# Patient Record
Sex: MALE | Race: WHITE | NOT HISPANIC OR LATINO | Employment: PART TIME | URBAN - METROPOLITAN AREA
[De-identification: names, ages, dates, MRNs, and addresses within clinical notes are randomized per-mention and may not be internally consistent; named-entity substitution may affect disease eponyms.]

---

## 2024-03-01 ENCOUNTER — OFFICE VISIT (OUTPATIENT)
Dept: ORTHOPEDIC SURGERY | Facility: CLINIC | Age: 69
End: 2024-03-01
Payer: MEDICARE

## 2024-03-01 ENCOUNTER — HOSPITAL ENCOUNTER (OUTPATIENT)
Dept: RADIOLOGY | Facility: CLINIC | Age: 69
Discharge: HOME | End: 2024-03-01
Payer: MEDICARE

## 2024-03-01 DIAGNOSIS — M47.27 LUMBOSACRAL RADICULOPATHY DUE TO DEGENERATIVE JOINT DISEASE OF SPINE: ICD-10-CM

## 2024-03-01 DIAGNOSIS — M25.552 HIP PAIN, ACUTE, LEFT: ICD-10-CM

## 2024-03-01 PROCEDURE — 99214 OFFICE O/P EST MOD 30 MIN: CPT | Performed by: STUDENT IN AN ORGANIZED HEALTH CARE EDUCATION/TRAINING PROGRAM

## 2024-03-01 PROCEDURE — 73502 X-RAY EXAM HIP UNI 2-3 VIEWS: CPT | Mod: LEFT SIDE | Performed by: STUDENT IN AN ORGANIZED HEALTH CARE EDUCATION/TRAINING PROGRAM

## 2024-03-01 PROCEDURE — 1036F TOBACCO NON-USER: CPT | Performed by: STUDENT IN AN ORGANIZED HEALTH CARE EDUCATION/TRAINING PROGRAM

## 2024-03-01 PROCEDURE — 99204 OFFICE O/P NEW MOD 45 MIN: CPT | Performed by: STUDENT IN AN ORGANIZED HEALTH CARE EDUCATION/TRAINING PROGRAM

## 2024-03-01 PROCEDURE — 73502 X-RAY EXAM HIP UNI 2-3 VIEWS: CPT | Mod: LT

## 2024-03-01 PROCEDURE — 1159F MED LIST DOCD IN RCRD: CPT | Performed by: STUDENT IN AN ORGANIZED HEALTH CARE EDUCATION/TRAINING PROGRAM

## 2024-03-01 RX ORDER — CYCLOBENZAPRINE HCL 10 MG
10 TABLET ORAL NIGHTLY PRN
Qty: 30 TABLET | Refills: 0 | Status: SHIPPED | OUTPATIENT
Start: 2024-03-01 | End: 2024-04-24

## 2024-03-01 RX ORDER — METHYLPREDNISOLONE 4 MG/1
TABLET ORAL
Qty: 21 TABLET | Refills: 0 | Status: SHIPPED | OUTPATIENT
Start: 2024-03-01 | End: 2024-04-06 | Stop reason: WASHOUT

## 2024-03-01 NOTE — PROGRESS NOTES
Chief Complaint   Patient presents with    Left Hip - Pain, New Patient Visit     Xrays today       HPI  69-year-old male with longstanding left hip pain/buttock pain that has been present since October.  And then seeing a chiropractor states that this has actually Made it worse.  States the pain is primary about the posterior aspect of the buttock and does radiate down the leg.  States that the pain is worse when getting up from a seated chair take about the low back.  Some days better than others.  Denies any bowel or bladder symptoms.    History reviewed. No pertinent past medical history.    History reviewed. No pertinent surgical history.     No Known Allergies     Physical exam    General: Alert and oriented to place, person, and time.  No acute distress and breathing comfortably; pleasant and cooperative with the examination.  HEENT: Head is normocephalic and atraumatic.  Neck: Supple, no visible swelling.  Cardiovascular: Good perfusion to the affected extremity.  Lungs: No audible wheezing or labored breathing.  Abdomen: Nondistended  HEME/Lymph : No visible abnormalities bilateral lower extremity    Extremity:  Physical Examination:  Left Hip:  Normal gait  Negative Trendelenburg sign  Skin healthy and intact  No tenderness to palpation over lumbar spine  No tenderness over greater trochanter    Full forward flexion  Symmetric motion, no loss of internal rotation   No weakness with resisted hip flexion, abduction or adduction    Negative flexion/adduction/internal rotation  Negative flexion/abduction/external rotation test  Pain with straight leg raise    Motor exam: Gross strength intact knee flexion extension, hip flexion extension, ankle dorsiflexion plantarflexion without any obvious deficits  Sensation intact L2-S1  No upper motor neuron signs      Diagnostics:  XR hip left with pelvis when performed 2 or 3 views    Result Date: 3/1/2024  Interpreted By:  Felice Haines III, STUDY: XR HIP LEFT  WITH PELVIS WHEN PERFORMED 2 OR 3 VIEWS; ;  3/1/2024 9:59 am   INDICATION: Signs/Symptoms:pain.   COMPARISON: None.   ACCESSION NUMBER(S): [id number]   ORDERING CLINICIAN: MARCOS COBOS   FINDINGS: AP pelvis, lateral left hip: No acute osseous abnormality no fracture or dislocation appreciated mild joint space narrowing about the left hip articulation with mild subchondral cystic change. No blastic or lytic lesions appreciated.       No acute osseous abnormality     MACRO: None   Signed by: Marcos Cobos III 3/1/2024 11:54 AM Dictation workstation:   [id number]       Procedure:  Procedures    Assessment:  69-year-old male with degenerative disc disease lumbar spine, lumbar radiculopathy    Treatment plan:  The natural history of the condition and its associated treatment alternatives including surgical and nonsurgical options were discussed with the patient at length.  Will give patient a prescription for oral Medrol Dosepak, Flexeril as well as provide a prescription for physical therapy  Patient did provide x-rays today that were obtained at chiropractic office which did indicate severe degenerative scoliosis about the lumbar spine multilevel diffuse degenerative changes.  I do think this is likely emanating from the lumbar spine.  Patient feels improved with therapy as well as steroids we will proceed with the MRI.  Follow-up in 6 weeks to 2 months time.  Patient works in the pool industry.  All of the patient's questions were answered.

## 2024-03-08 ENCOUNTER — APPOINTMENT (OUTPATIENT)
Dept: PRIMARY CARE | Facility: CLINIC | Age: 69
End: 2024-03-08
Payer: MEDICARE

## 2024-03-12 NOTE — PROGRESS NOTES
"  Physical Therapy  Physical Therapy Evaluation    Patient Name: Ridge Thomas \"Tom\"  MRN: [id number]  Today's Date: 3/13/2024  Time Calculation  Start Time: 0908  Stop Time: 0953  Time Calculation (min): 45 min  Insurance:  COPAY 0 (0)  Visit number: 1   Authorization info: NO AUTH REQ   Insurance Type: MEDICARE/ MMOSUP    General:  Reason for visit: Back pain  Referred by: MD Zaki    Current Problem:  M47.27 Lumbosacral radiculopathy due to degenerative joint disease of spine     Precautions:        Medical History Form: Reviewed (scanned into chart)    Subjective:   69-year-old male with longstanding left hip pain/buttock pain that has been present since October.  And then seeing a chiropractor states that this has actually Made it worse.  States the pain is primary about the posterior aspect of the buttock and does radiate down the leg.  States that the pain is worse when getting up from a seated chair take about the low back.  Some days better than others.  Denies any bowel or bladder symptoms.-Encounter note 3/1/2024     Chief Complaint: Patient presents to clinic LBP  Onset Date: 10/1/2023  ZIGGY: Insidious    Current Condition:   Better    Pain:  Pain Assessment: 0-10  Pain Score: 7  Pain Type: Deep somatic pain  Pain Location: Back  Pain Orientation: Left  Pain Radiating Towards: Left LE  Pain Descriptors: Sharp  Pain Frequency: Intermittent  Clinical Progression: Not changed  Effect of Pain on Daily Activities: makes it difficult  Aggravating Factors:  Bending Forward  Relieving Factors:  Rest    Relevant Information (PMH & Previous Tests/Imaging): XR  Previous Interventions/Treatments: Chiropractic    Prior Level of Function (PLOF)  Patient previously independent with all ADLs  Work/School: Seasonal-to start 4/1/2024    Patients Living Environment: Reviewed and no concern    Primary Language: English    Patient's Goal(s) for Therapy: Pain relief    Red Flags: Do you have any of the following? " No  Fever/chills, unexplained weight changes, dizziness/fainting, unexplained change in bowel or bladder functions, unexplained malaise or muscle weakness, night pain/sweats, numbness or tingling    Objective:  Slump (-)  Leg Lowering (+)    LUMBAR AROM  FF 5  EX 10  RR 5  LR 5  RSB 5  LSB 5    HIP AROM  R WFL  L WFL    Posture: WNL    Lower Extremity Functional Movements  Transfers: IND  Gait: IND    Outcome Measures:  OSWESTRY 18%    EDUCATION: Pt educated in HEP, PT POC, anatomy, activity avoidance, proper positioning/posture, use of cold , pt demonstrates understanding of PT info, all questions answered.    HEP:    Goals: Set and discussed today  Increase ROM to WFL of LB  Increase Strength where deficits noted by 1/3 to 1 mm grade of abdominal mm  Ind w/ HEP to expedite progress and promote goal achievement.    Improve Outcome score to < or equal to 10% for evidence of improved function.  Return to PLOF   Decrease pain to 2/10 or less    Plan of care was developed with input and agreement by the patient.    Treatment Performed:    Therapeutic Exercise:    23 min  SKC 10x, DKC 10x, Ab Setting 10x10s, QS 20x, GS 20x, Crunches 2x5    Assessment: 68 y/o M presents with c/o LBP and LUE pain. Upon examination patient demonstrates moderate pain limiting overall functional mobility including FF of back. Activity limitations and participations restrictions include tying shoes. Pt to benefit from outpatient PT to address deficits, maximize functional mobility and improve QOL.  The clinical presentation of this patient is stable and their history and examination findings are consistent with a low complexity evaluation with good rehab potential.          Plan:     Planned Interventions include: therapeutic exercise, self-care home management, manual therapy, therapeutic activities, gait training, neuromuscular coordination, vasopneumatic, dry needling, aquatic therapy  Frequency: 2x/wk  Duration: 4wks    David Dietz  PT

## 2024-03-13 ENCOUNTER — EVALUATION (OUTPATIENT)
Dept: PHYSICAL THERAPY | Facility: CLINIC | Age: 69
End: 2024-03-13
Payer: MEDICARE

## 2024-03-13 DIAGNOSIS — M47.27 LUMBOSACRAL RADICULOPATHY DUE TO DEGENERATIVE JOINT DISEASE OF SPINE: ICD-10-CM

## 2024-03-13 PROCEDURE — 97110 THERAPEUTIC EXERCISES: CPT | Mod: GP

## 2024-03-13 ASSESSMENT — PAIN - FUNCTIONAL ASSESSMENT: PAIN_FUNCTIONAL_ASSESSMENT: 0-10

## 2024-03-13 ASSESSMENT — ENCOUNTER SYMPTOMS
OCCASIONAL FEELINGS OF UNSTEADINESS: 0
DEPRESSION: 0
LOSS OF SENSATION IN FEET: 0

## 2024-03-13 ASSESSMENT — PAIN DESCRIPTION - DESCRIPTORS: DESCRIPTORS: SHARP

## 2024-03-13 ASSESSMENT — PAIN SCALES - GENERAL: PAINLEVEL_OUTOF10: 7

## 2024-03-15 ENCOUNTER — APPOINTMENT (OUTPATIENT)
Dept: PRIMARY CARE | Facility: CLINIC | Age: 69
End: 2024-03-15
Payer: MEDICARE

## 2024-03-20 ENCOUNTER — TREATMENT (OUTPATIENT)
Dept: PHYSICAL THERAPY | Facility: CLINIC | Age: 69
End: 2024-03-20
Payer: MEDICARE

## 2024-03-20 DIAGNOSIS — M47.27 LUMBOSACRAL RADICULOPATHY DUE TO DEGENERATIVE JOINT DISEASE OF SPINE: Primary | ICD-10-CM

## 2024-03-20 PROCEDURE — 97110 THERAPEUTIC EXERCISES: CPT | Mod: GP,CQ

## 2024-03-20 PROCEDURE — 97140 MANUAL THERAPY 1/> REGIONS: CPT | Mod: GP,CQ

## 2024-03-20 ASSESSMENT — PAIN - FUNCTIONAL ASSESSMENT: PAIN_FUNCTIONAL_ASSESSMENT: 0-10

## 2024-03-20 ASSESSMENT — PAIN SCALES - GENERAL: PAINLEVEL_OUTOF10: 5 - MODERATE PAIN

## 2024-03-20 NOTE — PROGRESS NOTES
Physical Therapy Treatment    Patient Name: Ridge Thomas  MRN: [id number]  Today's Date: 3/20/2024  Time Calculation  Start Time: 0910  Stop Time: 0950  Time Calculation (min): 40 min  PT Therapeutic Procedures Time Entry  Manual Therapy Time Entry: 15  Therapeutic Exercise Time Entry: 25       Current Problem  1. Lumbosacral radiculopathy due to degenerative joint disease of spine            Subjective   General   Pt stating continued low back pain. Still with some radicular pain into L hip and thigh. Denies saddle anesthesia. Denies bladder/bowel incontinence.   Insurance   COPAY 0 (0)  Visit number: 2  Authorization info: NO AUTH REQ   Insurance Type: MEDICARE/ Cash Check Card  Pain  Pain Assessment: 0-10  Pain Score: 5 - Moderate pain  Pain Location: Back  Pain Orientation: Lower    Objective   Treatments:  L lateral shift corrections 4x10  Standing extensions x10  Prone 3 min  DARIO 3 min  Bridges 2x10  SLR supine/prone x15    Manual 15' PA mobs lumbar spine  Assessment   Resolution L LE symptoms L lateral flexion at wall with reduction LBP extension in prone. Unable to tolerate loaded extensions today with recreation radicular symptoms noted. Educated on typical response to treatment, emphasis on symptom management at this point. Educated on spinal alignment, avoiding prolonged flexion forces through lumbar spine including use of lumbar roll when sitting.   Plan:  Progress with POC as tolerated.    OP EDUCATION:   Access Code: [email or web address]  URL: [email or web address]  Date: 03/20/2024  Prepared by: Earle Mukherjee    Goals:

## 2024-03-25 NOTE — PROGRESS NOTES
Physical Therapy Treatment    Patient Name: Ridge Thomas  MRN: [id number]  Today's Date: 3/26/2024  Time Calculation  Start Time: 0955  Stop Time: 1038  Time Calculation (min): 43 min  Current Problem  No diagnosis found.    Subjective   General   Pt states he feels much better and is not having any pain currently.  Insurance   COPAY 0 (0)  Visit number: 4  Authorization info: NO AUTH REQ   Insurance Type: MEDICARE/ MMOSUP  Pain  Pain Assessment: 0-10  Pain Score: 0 - No pain  Pain Location: Back  Pain Orientation: Lower    Objective   Treatments:  MARIAM 6min  Standing extensions x20  Standing lateral bends 20x BL  Pulldowns GTB 20x  Rows GTB 20x  Trunk RR/LR GTB 20x ea  Prone 3 min  DARIO 3 min  Bridges 2x15  SLR supine/prone x15    Manual 10' PA mobs lumbar spine    Assessment:   Pt able to tolerate tx session well this date w/ no adverse effects noted from tx.  Pt compliant w/ program and appears to understand rationale for therapy.  Still requires skilled therapy for increasing strength/ROM for performing ADLs.      Plan:   Cont w current POC and progress pt as tolerated.     OP EDUCATION:   Access Code: [email or web address]  URL: [email or web address]  Date: 03/20/2024  Prepared by: Earle Mukherjee    Goals:

## 2024-03-26 ENCOUNTER — TREATMENT (OUTPATIENT)
Dept: PHYSICAL THERAPY | Facility: CLINIC | Age: 69
End: 2024-03-26
Payer: MEDICARE

## 2024-03-26 DIAGNOSIS — M47.27 LUMBOSACRAL RADICULOPATHY DUE TO DEGENERATIVE JOINT DISEASE OF SPINE: Primary | ICD-10-CM

## 2024-03-26 PROCEDURE — 97140 MANUAL THERAPY 1/> REGIONS: CPT | Mod: GP

## 2024-03-26 PROCEDURE — 97110 THERAPEUTIC EXERCISES: CPT | Mod: GP

## 2024-03-26 ASSESSMENT — PAIN - FUNCTIONAL ASSESSMENT: PAIN_FUNCTIONAL_ASSESSMENT: 0-10

## 2024-03-26 ASSESSMENT — PAIN SCALES - GENERAL: PAINLEVEL_OUTOF10: 0 - NO PAIN

## 2024-03-28 ENCOUNTER — OFFICE VISIT (OUTPATIENT)
Dept: PRIMARY CARE | Facility: CLINIC | Age: 69
End: 2024-03-28
Payer: MEDICARE

## 2024-03-28 VITALS
HEIGHT: 72 IN | DIASTOLIC BLOOD PRESSURE: 82 MMHG | OXYGEN SATURATION: 98 % | SYSTOLIC BLOOD PRESSURE: 130 MMHG | TEMPERATURE: 97.9 F | WEIGHT: 210.9 LBS | HEART RATE: 80 BPM | BODY MASS INDEX: 28.56 KG/M2 | RESPIRATION RATE: 16 BRPM

## 2024-03-28 DIAGNOSIS — M47.27 LUMBOSACRAL RADICULOPATHY DUE TO DEGENERATIVE JOINT DISEASE OF SPINE: Primary | ICD-10-CM

## 2024-03-28 DIAGNOSIS — Z13.220 LIPID SCREENING: ICD-10-CM

## 2024-03-28 DIAGNOSIS — Z12.11 ENCOUNTER FOR SCREENING FOR MALIGNANT NEOPLASM OF COLON: ICD-10-CM

## 2024-03-28 DIAGNOSIS — L30.9 DERMATITIS: ICD-10-CM

## 2024-03-28 DIAGNOSIS — E78.5 DYSLIPIDEMIA: ICD-10-CM

## 2024-03-28 PROCEDURE — 99203 OFFICE O/P NEW LOW 30 MIN: CPT | Performed by: FAMILY MEDICINE

## 2024-03-28 ASSESSMENT — ENCOUNTER SYMPTOMS
COUGH: 0
BLOOD IN STOOL: 0
POLYPHAGIA: 0
SLEEP DISTURBANCE: 0
ABDOMINAL DISTENTION: 0
CONSTIPATION: 0
NERVOUS/ANXIOUS: 0
FATIGUE: 0
AGITATION: 0
HEADACHES: 0
CONSTITUTIONAL NEGATIVE: 1
ADENOPATHY: 0
RHINORRHEA: 0
STRIDOR: 0
PALPITATIONS: 0
BACK PAIN: 1
SINUS PRESSURE: 0
TROUBLE SWALLOWING: 0
NUMBNESS: 1
FEVER: 0
DYSPHORIC MOOD: 0
DIARRHEA: 0
NECK STIFFNESS: 0
SPEECH DIFFICULTY: 0
SORE THROAT: 0
HEMATURIA: 0
APPETITE CHANGE: 0
FLANK PAIN: 0
DIZZINESS: 0
SEIZURES: 0
CHEST TIGHTNESS: 0
PHOTOPHOBIA: 0
ACTIVITY CHANGE: 0
MYALGIAS: 0
POLYDIPSIA: 0
DECREASED CONCENTRATION: 0
SINUS PAIN: 0
RECTAL PAIN: 0
CONFUSION: 0
ABDOMINAL PAIN: 0
DYSURIA: 0
EYE PAIN: 0
SHORTNESS OF BREATH: 0

## 2024-03-28 NOTE — PATIENT INSTRUCTIONS
Follow up in 3 months    Continue current medications and therapy for chronic medical conditions.    Patient was advised importance of proper diet/nutrition in addition adequate hydration. Patient was encouraged moderate exercise program to include 30 minutes daily for 5 days of the week or 150 minutes weekly. Patient will follow-up with us as scheduled.    Cologuard ordered    Obtain CMP and lipids labs    Review xray of left hip from March 2024    MR lumbar spine ordered

## 2024-03-28 NOTE — PROGRESS NOTES
Subjective   Patient ID: Ridge Thomas is a 69 y.o. male who presents for Establish Care, Back Pain, and Rash.    Patient presents in office to establish care with a new pcp. Patient admits he would like to discuss a reoccurring pain in his lumbar spine. 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 Patient admits he has been experiencing a reoccurring pain in his lumbar spine radiating into left hip and numbing sensation. Patient has been prescribed muscle relaxer's. Completed oral steroid therapy. Patient is currently attending physical therapy twice a week which he feels has been providing moderate relief.   Has tried chiropractor adjustment in the past.    Patient presents in office with a complaint of itching. Patient admits he has been itching and irritation sensation on his bilateral upper inner thighs but there is no visible rash. Patient describes it as a intermittent complication. Patient has tried applying otc topicals which provided no relief. Patient denies any changes to his dietary regimen or cleaning solutions.          Review of Systems   Constitutional: Negative.  Negative for activity change, appetite change, fatigue and fever.   HENT:  Negative for congestion, dental problem, ear discharge, ear pain, mouth sores, rhinorrhea, sinus pressure, sinus pain, sore throat, tinnitus and trouble swallowing.    Eyes:  Negative for photophobia, pain and visual disturbance.   Respiratory:  Negative for cough, chest tightness, shortness of breath and stridor.    Cardiovascular:  Negative for chest pain and palpitations.   Gastrointestinal:  Negative for abdominal distention, abdominal pain, blood in stool, constipation, diarrhea and rectal pain.   Endocrine: Negative for cold intolerance, heat intolerance, polydipsia, polyphagia and polyuria.   Genitourinary:  Negative for dysuria, flank pain, hematuria and urgency.   Musculoskeletal:  Positive for arthralgias and back pain. Negative for gait problem,  myalgias and neck stiffness.   Skin:  Positive for color change and rash.   Allergic/Immunologic: Negative for environmental allergies and food allergies.   Neurological:  Positive for numbness. Negative for dizziness, seizures, syncope, speech difficulty and headaches.   Hematological:  Negative for adenopathy.   Psychiatric/Behavioral:  Negative for agitation, confusion, decreased concentration, dysphoric mood and sleep disturbance. The patient is not nervous/anxious.        Objective   /82   Pulse 80   Temp 36.6 °C (97.9 °F) (Temporal)   Resp 16   Ht 1.829 m (6')   Wt 95.7 kg (210 lb 14.4 oz)   SpO2 98%   BMI 28.60 kg/m²     Physical Exam  Vitals reviewed.   Constitutional:       General: He is not in acute distress.     Appearance: Normal appearance. He is normal weight. He is not ill-appearing or diaphoretic.   HENT:      Head: Normocephalic.      Right Ear: Tympanic membrane and external ear normal.      Left Ear: Tympanic membrane and external ear normal.      Nose: Nose normal. No congestion.      Mouth/Throat:      Pharynx: No posterior oropharyngeal erythema.   Eyes:      General:         Right eye: No discharge.         Left eye: No discharge.      Extraocular Movements: Extraocular movements intact.      Conjunctiva/sclera: Conjunctivae normal.      Pupils: Pupils are equal, round, and reactive to light.   Cardiovascular:      Rate and Rhythm: Normal rate and regular rhythm.      Pulses: Normal pulses.      Heart sounds: Normal heart sounds. No murmur heard.  Pulmonary:      Effort: Pulmonary effort is normal. No respiratory distress.      Breath sounds: Normal breath sounds. No wheezing or rales.   Chest:      Chest wall: No tenderness.   Abdominal:      General: Abdomen is flat. Bowel sounds are normal. There is no distension.      Palpations: There is no mass.      Tenderness: There is no abdominal tenderness. There is no guarding.   Musculoskeletal:         General: Tenderness present.  Normal range of motion.      Cervical back: Normal range of motion and neck supple. No tenderness.      Right lower leg: No edema.      Left lower leg: No edema.   Skin:     General: Skin is dry.      Coloration: Skin is not jaundiced.      Findings: Rash present. No bruising or erythema.   Neurological:      General: No focal deficit present.      Mental Status: He is alert and oriented to person, place, and time. Mental status is at baseline.      Cranial Nerves: No cranial nerve deficit.      Sensory: No sensory deficit.      Coordination: Coordination normal.      Gait: Gait normal.   Psychiatric:         Mood and Affect: Mood normal.         Thought Content: Thought content normal.         Judgment: Judgment normal.         Assessment/Plan   Problem List Items Addressed This Visit             ICD-10-CM    Lumbosacral radiculopathy due to degenerative joint disease of spine - Primary M47.27    Relevant Orders    Comprehensive Metabolic Panel (Completed)    MR lumbar spine wo IV contrast     Other Visit Diagnoses         Codes    Encounter for screening for malignant neoplasm of colon     Z12.11    Relevant Orders    Cologuard® colon cancer screening    Lipid screening     Z13.220    Relevant Orders    Lipid Panel (Completed)    Dyslipidemia     E78.5    Relevant Orders    CT cardiac scoring wo IV contrast    Dermatitis     L30.9          Scribe Attestation  By signing my name below, I, Jeremy Mendez DO , Scribe   attest that this documentation has been prepared under the direction and in the presence of Jeremy Mendez DO.    Provider Attestation - Scribe documentation    All medical record entries made by the Scribe were at my direction and personally dictated by me. I have reviewed the chart and agree that the record accurately reflects my personal performance of the history, physical exam, discussion and plan.

## 2024-03-28 NOTE — PROGRESS NOTES
Physical Therapy Treatment    Patient Name: Ridge Thomas  MRN: [id number]  Today's Date: 3/29/2024  Time Calculation  Start Time: 1033  Stop Time: 1115  Time Calculation (min): 42 min  Current Problem  1. Lumbosacral radiculopathy due to degenerative joint disease of spine            Subjective   Pt reports he is doing well and not feeling pain in back.    Insurance   COPAY 0 (0)  Visit number: 5  Authorization info: NO AUTH REQ   Insurance Type: MEDICARE/ OS  Pain  Pain Assessment: 0-10  Pain Score: 0 - No pain  Pain Location: Back    Objective   Treatments:  MARIAM 8min  Standing extensions x20  Standing lateral bends 20x BL  Pulldowns GTB 20x  Rows GTB 20x  Trunk RR/LR GTB 20x ea  DARIO 3 min  Bridges 3x10  SLR supine/prone 3x10  Not today:  Prone 3 min    Manual 8' PA mobs lumbar spine    Assessment:  Making gains toward goals. Pt able to tolerate tx session well this date w/ no adverse effects noted from tx.  Pt compliant w/ program and appears to understand rationale for therapy.  Still requires skilled therapy for increasing strength/ROM for performing ADLs.      Plan:  Cont w current POC and progress pt as tolerated.     OP EDUCATION:   Access Code: [email or web address]  URL: [email or web address]  Date: 03/20/2024  Prepared by: Earle Mukherjee    Goals:

## 2024-03-29 ENCOUNTER — TREATMENT (OUTPATIENT)
Dept: PHYSICAL THERAPY | Facility: CLINIC | Age: 69
End: 2024-03-29
Payer: MEDICARE

## 2024-03-29 ENCOUNTER — LAB (OUTPATIENT)
Dept: LAB | Facility: LAB | Age: 69
End: 2024-03-29
Payer: MEDICARE

## 2024-03-29 DIAGNOSIS — M47.27 LUMBOSACRAL RADICULOPATHY DUE TO DEGENERATIVE JOINT DISEASE OF SPINE: ICD-10-CM

## 2024-03-29 DIAGNOSIS — M47.27 LUMBOSACRAL RADICULOPATHY DUE TO DEGENERATIVE JOINT DISEASE OF SPINE: Primary | ICD-10-CM

## 2024-03-29 DIAGNOSIS — Z13.220 LIPID SCREENING: ICD-10-CM

## 2024-03-29 DIAGNOSIS — E78.2 MIXED HYPERLIPIDEMIA: ICD-10-CM

## 2024-03-29 LAB
ALBUMIN SERPL BCP-MCNC: 4.6 G/DL (ref 3.4–5)
ALP SERPL-CCNC: 54 U/L (ref 33–136)
ALT SERPL W P-5'-P-CCNC: 21 U/L (ref 10–52)
ANION GAP SERPL CALC-SCNC: 12 MMOL/L (ref 10–20)
AST SERPL W P-5'-P-CCNC: 15 U/L (ref 9–39)
BILIRUB SERPL-MCNC: 2.2 MG/DL (ref 0–1.2)
BUN SERPL-MCNC: 18 MG/DL (ref 6–23)
CALCIUM SERPL-MCNC: 9.8 MG/DL (ref 8.6–10.3)
CHLORIDE SERPL-SCNC: 105 MMOL/L (ref 98–107)
CHOLEST SERPL-MCNC: 255 MG/DL (ref 0–199)
CHOLESTEROL/HDL RATIO: 6
CO2 SERPL-SCNC: 27 MMOL/L (ref 21–32)
CREAT SERPL-MCNC: 0.92 MG/DL (ref 0.5–1.3)
EGFRCR SERPLBLD CKD-EPI 2021: 89 ML/MIN/1.73M*2
GLUCOSE SERPL-MCNC: 105 MG/DL (ref 74–99)
HDLC SERPL-MCNC: 42.2 MG/DL
LDLC SERPL CALC-MCNC: 187 MG/DL
NON HDL CHOLESTEROL: 213 MG/DL (ref 0–149)
POTASSIUM SERPL-SCNC: 4.5 MMOL/L (ref 3.5–5.3)
PROT SERPL-MCNC: 7 G/DL (ref 6.4–8.2)
SODIUM SERPL-SCNC: 139 MMOL/L (ref 136–145)
TRIGL SERPL-MCNC: 131 MG/DL (ref 0–149)
VLDL: 26 MG/DL (ref 0–40)

## 2024-03-29 PROCEDURE — 36415 COLL VENOUS BLD VENIPUNCTURE: CPT

## 2024-03-29 PROCEDURE — 80061 LIPID PANEL: CPT

## 2024-03-29 PROCEDURE — 80053 COMPREHEN METABOLIC PANEL: CPT

## 2024-03-29 PROCEDURE — 97110 THERAPEUTIC EXERCISES: CPT | Mod: GP

## 2024-03-29 PROCEDURE — 97140 MANUAL THERAPY 1/> REGIONS: CPT | Mod: GP

## 2024-03-29 ASSESSMENT — PAIN SCALES - GENERAL: PAINLEVEL_OUTOF10: 0 - NO PAIN

## 2024-03-29 ASSESSMENT — PAIN - FUNCTIONAL ASSESSMENT: PAIN_FUNCTIONAL_ASSESSMENT: 0-10

## 2024-03-29 NOTE — LETTER
April 8, 2024     Ridge Thomas  [address] [place] [address]      Dear Mr. Thomas:    Below are the results from your recent visit:    LAB RESULTS WITHIN NORMAL LIMITS EXCEPT CHOLESTEROL IS ELEVATED. RECOMMEND LOW CHOLESTEROL DIET TO AVOID FRIED FOODS, CHEESES AND RED MEATS AND EXERCISE. CONSIDER STATIN THERAPY IN THE FUTURE .    Resulted Orders   Comprehensive Metabolic Panel   Result Value Ref Range    Glucose 105 (H) 74 - 99 mg/dL    Sodium 139 136 - 145 mmol/L    Potassium 4.5 3.5 - 5.3 mmol/L    Chloride 105 98 - 107 mmol/L    Bicarbonate 27 21 - 32 mmol/L    Anion Gap 12 10 - 20 mmol/L    Urea Nitrogen 18 6 - 23 mg/dL    Creatinine 0.92 0.50 - 1.30 mg/dL    eGFR 89 >60 mL/min/1.73m*2      Comment:      Calculations of estimated GFR are performed using the 2021 CKD-EPI Study Refit equation without the race variable for the IDMS-Traceable creatinine methods.  https://jasn.asnjournals.org/content/early/2021/09/22/ASN.9635483190    Calcium 9.8 8.6 - 10.3 mg/dL    Albumin 4.6 3.4 - 5.0 g/dL    Alkaline Phosphatase 54 33 - 136 U/L    Total Protein 7.0 6.4 - 8.2 g/dL    AST 15 9 - 39 U/L    Bilirubin, Total 2.2 (H) 0.0 - 1.2 mg/dL    ALT 21 10 - 52 U/L      Comment:      Patients treated with Sulfasalazine may generate falsely decreased results for ALT.   Lipid Panel   Result Value Ref Range    Cholesterol 255 (H) 0 - 199 mg/dL      Comment:            Age      Desirable   Borderline High   High     0-19 Y     0 - 169       170 - 199     >/= 200    20-24 Y     0 - 189       190 - 224     >/= 225         >24 Y     0 - 199       200 - 239     >/= 240   **All ranges are based on fasting samples. Specific   therapeutic targets will vary based on patient-specific   cardiac risk.    Pediatric guidelines reference:Pediatrics 2011, 128(S5).Adult guidelines reference: NCEP ATPIII Guidelines,SINDY 2001, 258:2486-97    Venipuncture immediately after or during the administration of Metamizole may lead to falsely low results.  Testing should be performed immediately prior to Metamizole dosing.    HDL-Cholesterol 42.2 mg/dL      Comment:        Age       Very Low   Low     Normal    High    0-19 Y    < 35      < 40     40-45     ----  20-24 Y    ----     < 40      >45      ----        >24 Y      ----     < 40     40-60      >60      Cholesterol/HDL Ratio 6.0       Comment:        Ref Values  Desirable  < 3.4  High Risk  > 5.0    LDL Calculated 187 (H) <=99 mg/dL      Comment:                                  Near   Borderline      AGE      Desirable  Optimal    High     High     Very High     0-19 Y     0 - 109     ---    110-129   >/= 130     ----    20-24 Y     0 - 119     ---    120-159   >/= 160     ----      >24 Y     0 -  99   100-129  130-159   160-189     >/=190      VLDL 26 0 - 40 mg/dL    Triglycerides 131 0 - 149 mg/dL      Comment:         Age         Desirable   Borderline High   High     Very High   0 D-90 D    19 - 174         ----         ----        ----  91 D- 9 Y     0 -  74        75 -  99     >/= 100      ----    10-19 Y     0 -  89        90 - 129     >/= 130      ----    20-24 Y     0 - 114       115 - 149     >/= 150      ----         >24 Y     0 - 149       150 - 199    200- 499    >/= 500    Venipuncture immediately after or during the administration of Metamizole may lead to falsely low results. Testing should be performed immediately prior to Metamizole dosing.    Non HDL Cholesterol 213 (H) 0 - 149 mg/dL      Comment:            Age       Desirable   Borderline High   High     Very High     0-19 Y     0 - 119       120 - 144     >/= 145    >/= 160    20-24 Y     0 - 149       150 - 189     >/= 190      ----         >24 Y    30 mg/dL above LDL Cholesterol goal       The test results show that your current treatment is working. Please continue your current medication and plan. We recommend that you repeat the above test(s) in 3 months.    If you have any questions or concerns, please don't hesitate to call.          Sincerely,        RALEIHG WHALEY, DO

## 2024-04-02 ENCOUNTER — TREATMENT (OUTPATIENT)
Dept: PHYSICAL THERAPY | Facility: CLINIC | Age: 69
End: 2024-04-02
Payer: MEDICARE

## 2024-04-02 DIAGNOSIS — M47.27 LUMBOSACRAL RADICULOPATHY DUE TO DEGENERATIVE JOINT DISEASE OF SPINE: Primary | ICD-10-CM

## 2024-04-02 PROCEDURE — 97110 THERAPEUTIC EXERCISES: CPT | Mod: GP,CQ

## 2024-04-02 ASSESSMENT — PAIN - FUNCTIONAL ASSESSMENT: PAIN_FUNCTIONAL_ASSESSMENT: 0-10

## 2024-04-02 ASSESSMENT — PAIN SCALES - GENERAL: PAINLEVEL_OUTOF10: 0 - NO PAIN

## 2024-04-02 NOTE — PROGRESS NOTES
Physical Therapy Treatment    Patient Name: Ridge Thomas  MRN: [id number]  Today's Date: 4/2/2024  Time Calculation  Start Time: 0245  Stop Time: 0315  Time Calculation (min): 30 min  PT Therapeutic Procedures Time Entry  Therapeutic Exercise Time Entry: 25       Current Problem  1. Lumbosacral radiculopathy due to degenerative joint disease of spine            Subjective   General   Pt stating continued LBP with occasional L hip and thigh pain, burning, ands intermittent weakness.  Insurance   COPAY 0 (0)  Visit number: 5  Authorization info: NO AUTH REQ   Insurance Type: MEDICARE/ MMOSUP  Precautions     Pain  Pain Assessment: 0-10  Pain Score: 0 - No pain    Objective   Treatments:  TM walking 1.7mph 5min  YAZMIN 3x10  L lateral shift corrections 2x10  Bridges 2x10  SLR supine/prone x15  Standing hip 3-way RTB x20    Assessment   Resolution L LE symptoms L lateral flexion at wall with reduction LBP extension in standing. Introduced additional hip and core stability exercises, but pt with limited tolerance to progression secondary to LE symptom onset. Educated on spinal alignment, avoiding prolonged flexion forces through lumbar spine including use of lumbar roll when sitting.   Plan:  Pt to be placed on temporary hold, to return following MRI 4/17/24.    OP EDUCATION:       Goals:

## 2024-04-04 ENCOUNTER — APPOINTMENT (OUTPATIENT)
Dept: PHYSICAL THERAPY | Facility: CLINIC | Age: 69
End: 2024-04-04
Payer: MEDICARE

## 2024-04-06 ASSESSMENT — ENCOUNTER SYMPTOMS
ARTHRALGIAS: 1
COLOR CHANGE: 1

## 2024-04-08 ENCOUNTER — TELEPHONE (OUTPATIENT)
Dept: PRIMARY CARE | Facility: CLINIC | Age: 69
End: 2024-04-08
Payer: MEDICARE

## 2024-04-08 DIAGNOSIS — L30.9 DERMATITIS: Primary | ICD-10-CM

## 2024-04-08 NOTE — TELEPHONE ENCOUNTER
----- Message from Jeremy Mendez DO sent at 4/6/2024 11:16 PM EDT -----  Lab results indicates an elevated total cholesterol of 255, with a LDL/bad cholesterol of 187.  Recommend low-cholesterol diet, that is, minimizing red meat, cheeses and fried foods.  Will need to consider statin therapy.  However, at this time we will go diet and exercise with repeat fasting lipid profile, CMP in 3 months.  Thank you

## 2024-04-08 NOTE — TELEPHONE ENCOUNTER
Ivan pt  Spoke w/pt advised exercise and diet   Pt was also wondering at his last apt dr garcia said he would send over some cream for his rash however nothing was sent. He still has it and would like something sent over

## 2024-04-09 ENCOUNTER — APPOINTMENT (OUTPATIENT)
Dept: PHYSICAL THERAPY | Facility: CLINIC | Age: 69
End: 2024-04-09
Payer: MEDICARE

## 2024-04-09 RX ORDER — CLOBETASOL PROPIONATE 0.5 MG/G
CREAM TOPICAL 2 TIMES DAILY
Qty: 60 G | Refills: 1 | Status: SHIPPED | OUTPATIENT
Start: 2024-04-09 | End: 2024-12-05

## 2024-04-11 ENCOUNTER — APPOINTMENT (OUTPATIENT)
Dept: PHYSICAL THERAPY | Facility: CLINIC | Age: 69
End: 2024-04-11
Payer: MEDICARE

## 2024-04-11 LAB — NONINV COLON CA DNA+OCC BLD SCRN STL QL: POSITIVE

## 2024-04-13 ENCOUNTER — HOSPITAL ENCOUNTER (OUTPATIENT)
Dept: RADIOLOGY | Facility: CLINIC | Age: 69
Discharge: HOME | End: 2024-04-13
Payer: MEDICARE

## 2024-04-13 DIAGNOSIS — E78.5 DYSLIPIDEMIA: ICD-10-CM

## 2024-04-13 PROCEDURE — 75571 CT HRT W/O DYE W/CA TEST: CPT

## 2024-04-17 ENCOUNTER — HOSPITAL ENCOUNTER (OUTPATIENT)
Dept: RADIOLOGY | Facility: CLINIC | Age: 69
Discharge: HOME | End: 2024-04-17
Payer: MEDICARE

## 2024-04-17 ENCOUNTER — TELEPHONE (OUTPATIENT)
Dept: PRIMARY CARE | Facility: CLINIC | Age: 69
End: 2024-04-17
Payer: MEDICARE

## 2024-04-17 DIAGNOSIS — M47.27 LUMBOSACRAL RADICULOPATHY DUE TO DEGENERATIVE JOINT DISEASE OF SPINE: ICD-10-CM

## 2024-04-17 DIAGNOSIS — R19.5 POSITIVE COLORECTAL CANCER SCREENING USING COLOGUARD TEST: ICD-10-CM

## 2024-04-17 PROCEDURE — 70200 X-RAY EXAM OF EYE SOCKETS: CPT | Mod: RSC

## 2024-04-17 PROCEDURE — 72148 MRI LUMBAR SPINE W/O DYE: CPT

## 2024-04-17 PROCEDURE — 72148 MRI LUMBAR SPINE W/O DYE: CPT | Performed by: RADIOLOGY

## 2024-04-17 PROCEDURE — 70200 X-RAY EXAM OF EYE SOCKETS: CPT | Mod: RSC | Performed by: RADIOLOGY

## 2024-04-17 NOTE — TELEPHONE ENCOUNTER
----- Message from Jeremy Mendez DO sent at 4/16/2024 11:02 PM EDT -----  Cologuard results are positive.  Please refer to gastroenterology.  Thank you

## 2024-04-19 ENCOUNTER — TREATMENT (OUTPATIENT)
Dept: PHYSICAL THERAPY | Facility: CLINIC | Age: 69
End: 2024-04-19
Payer: MEDICARE

## 2024-04-19 PROCEDURE — 97110 THERAPEUTIC EXERCISES: CPT | Mod: GP,CQ

## 2024-04-19 ASSESSMENT — PAIN - FUNCTIONAL ASSESSMENT: PAIN_FUNCTIONAL_ASSESSMENT: 0-10

## 2024-04-19 ASSESSMENT — PAIN SCALES - GENERAL: PAINLEVEL_OUTOF10: 3

## 2024-04-19 NOTE — PROGRESS NOTES
"Physical Therapy Treatment    Patient Name: Ridge Thomas  MRN: [id number]  Today's Date: 4/19/2024  Time Calculation  Start Time: 1045  Stop Time: 1127  Time Calculation (min): 42 min  PT Therapeutic Procedures Time Entry  Therapeutic Exercise Time Entry: 42     Current Problem  1. Lumbosacral radiculopathy due to degenerative joint disease of spine        Subjective   \"I feel better if I do my exercises.\"    General   Patient is back to work.     Insurance   COPAY 0 (0)  Visit number: 6  Authorization info: NO AUTH REQ   Insurance Type: MEDICARE/ [employer]  Precautions     Pain  Pain Assessment: 0-10  Pain Score: 3    Objective   Treatments:  TM walking 1.7mph 5min NT   YAZMIN 3x10  L lateral shift corrections 2x10  Bridges 2x10  SLR supine/prone/SL x15  Standing hip 3-way RTB x20  Clam shells RTB 2 x 10  Trunk RR/LR GTB 20x ea   Pallof Press GTB    Assessment   Patient continues to progress by reporting less pain and symptoms with ADL.s. Added clam shells, S/L SLR for hip strength/stability. Performed anti-rotations and Pallof Press for core stability. Cues for posture and working within pain free ROM.     Treatment by Connie SPRINGER  Supervised by Earle Mukherjee PTA  Plan:  Pt to take next couple of weeks with HEP to ascertain ability to modulate symptoms on his own.     OP EDUCATION:       Goals:     "

## 2024-04-24 ENCOUNTER — TELEMEDICINE (OUTPATIENT)
Dept: PRIMARY CARE | Facility: CLINIC | Age: 69
End: 2024-04-24
Payer: MEDICARE

## 2024-04-24 DIAGNOSIS — R19.5 POSITIVE COLORECTAL CANCER SCREENING USING COLOGUARD TEST: ICD-10-CM

## 2024-04-24 DIAGNOSIS — M48.062 SPINAL STENOSIS OF LUMBAR REGION WITH NEUROGENIC CLAUDICATION: Primary | ICD-10-CM

## 2024-04-24 PROCEDURE — 99443 PR PHYS/QHP TELEPHONE EVALUATION 21-30 MIN: CPT | Performed by: FAMILY MEDICINE

## 2024-04-24 RX ORDER — METHYLPREDNISOLONE 4 MG/1
TABLET ORAL
Qty: 21 TABLET | Refills: 0 | Status: SHIPPED | OUTPATIENT
Start: 2024-04-24 | End: 2024-05-01

## 2024-04-24 ASSESSMENT — ENCOUNTER SYMPTOMS
CONSTIPATION: 0
RECTAL PAIN: 0
ABDOMINAL DISTENTION: 0
SPEECH DIFFICULTY: 0
SLEEP DISTURBANCE: 0
FATIGUE: 0
ABDOMINAL PAIN: 0
ACTIVITY CHANGE: 0
CHEST TIGHTNESS: 0
AGITATION: 0
SHORTNESS OF BREATH: 0
SINUS PRESSURE: 0
NERVOUS/ANXIOUS: 0
TROUBLE SWALLOWING: 0
BACK PAIN: 1
CONFUSION: 0
NECK STIFFNESS: 0
HEADACHES: 0
BLOOD IN STOOL: 0
ARTHRALGIAS: 1
CONSTITUTIONAL NEGATIVE: 1
DIZZINESS: 0
FEVER: 0
ADENOPATHY: 0
PALPITATIONS: 0
DIARRHEA: 0
COUGH: 0
DYSPHORIC MOOD: 0
STRIDOR: 0
SEIZURES: 0
SINUS PAIN: 0
HEMATURIA: 0
COLOR CHANGE: 0
MYALGIAS: 0
DYSURIA: 0
FLANK PAIN: 0
DECREASED CONCENTRATION: 0
EYE PAIN: 0
RHINORRHEA: 0
POLYDIPSIA: 0
APPETITE CHANGE: 0
POLYPHAGIA: 0
PHOTOPHOBIA: 0
SORE THROAT: 0

## 2024-04-24 ASSESSMENT — PATIENT HEALTH QUESTIONNAIRE - PHQ9
2. FEELING DOWN, DEPRESSED OR HOPELESS: NOT AT ALL
SUM OF ALL RESPONSES TO PHQ9 QUESTIONS 1 AND 2: 0
1. LITTLE INTEREST OR PLEASURE IN DOING THINGS: NOT AT ALL

## 2024-04-24 NOTE — PROGRESS NOTES
Subjective   Patient ID: Ridge Thomas is a 69 y.o. male who presents for Results.    PHONE CALL    Patient presents today to follow up on test results. Patient had MRI of low back completed on 04/17/2024. He is using the flexeril as needed when his back is having a flare.     Patient would like to review lab results from 03/29/2024 and Cologuard from 04/05/2024. Patient is scheduled to see Dr. Courtney on 06/12/204         Review of Systems   Constitutional: Negative.  Negative for activity change, appetite change, fatigue and fever.   HENT:  Negative for congestion, dental problem, ear discharge, ear pain, mouth sores, rhinorrhea, sinus pressure, sinus pain, sore throat, tinnitus and trouble swallowing.    Eyes:  Negative for photophobia, pain and visual disturbance.   Respiratory:  Negative for cough, chest tightness, shortness of breath and stridor.    Cardiovascular:  Negative for chest pain and palpitations.   Gastrointestinal:  Negative for abdominal distention, abdominal pain, blood in stool, constipation, diarrhea and rectal pain.   Endocrine: Negative for cold intolerance, heat intolerance, polydipsia, polyphagia and polyuria.   Genitourinary:  Negative for dysuria, flank pain, hematuria and urgency.   Musculoskeletal:  Positive for arthralgias and back pain. Negative for gait problem, myalgias and neck stiffness.   Skin:  Negative for color change and rash.   Allergic/Immunologic: Negative for environmental allergies and food allergies.   Neurological:  Negative for dizziness, seizures, syncope, speech difficulty and headaches.   Hematological:  Negative for adenopathy.   Psychiatric/Behavioral:  Negative for agitation, confusion, decreased concentration, dysphoric mood and sleep disturbance. The patient is not nervous/anxious.        Objective   There were no vitals taken for this visit.    Physical Exam  Neurological:      Mental Status: He is alert and oriented to person, place, and time.    Psychiatric:         Mood and Affect: Mood normal.         Thought Content: Thought content normal.         Judgment: Judgment normal.         Constitutional: Well developed, well nourished, alert and in no acute distress   Psychiatric: Mood calm and affect normal    Telephone Visit - Audio Communication Only      Assessment/Plan   Problem List Items Addressed This Visit    None  Visit Diagnoses         Codes    Spinal stenosis of lumbar region with neurogenic claudication    -  Primary M48.062    Relevant Medications    methylPREDNISolone (Medrol Dospak) 4 mg tablets    Other Relevant Orders    Referral to Pain Medicine    Positive colorectal cancer screening using Cologuard test     R19.5

## 2024-04-24 NOTE — PATIENT INSTRUCTIONS
Follow up in 6 weeks    Continue current medications and therapy for chronic medical conditions.    Patient was advised importance of proper diet/nutrition in addition adequate hydration. Patient was encouraged moderate exercise program to include 30 minutes daily for 5 days of the week or 150 minutes weekly. Patient will follow-up with us as scheduled.    I personally reviewed the OARRS report for this patient. I have considered the risks of abuse, dependence, addiction, and diversion.    UDS/CSA:    Medrol dosepak    Review MRI of LS spine    Refer to pain management    Refer Gastroenterolgy/colonoscopy

## 2024-04-30 NOTE — PROGRESS NOTES
Physical Therapy Treatment    Patient Name: Ridge Thomas  MRN: [id number]  Today's Date: 5/3/2024  Time Calculation  Start Time: 1045  Stop Time: 1125  Time Calculation (min): 40 min  PT Therapeutic Procedures Time Entry  Therapeutic Exercise Time Entry: 40     Current Problem  1. Lumbosacral radiculopathy due to degenerative joint disease of spine        Subjective   Pt states he is feeling well overall and is minding his work activity so he does not injury his back further.  Also, he states he had steroid pack and      Imaging- 4-17-24  IMPRESSION:  At L4-L5 there is left-greater-than-right facet arthropathy with  ligamentum flavum hypertrophy and lumbar spondylosis, also worse  along the leftward aspect, contributing to moderate spinal canal  stenosis with left lateral recess effacement, compression of the  descending left L5 nerve root, and severe left L4 neural foraminal  narrowing with impingement of the exiting left L4 nerve root.   Mild-to-moderate spinal canal stenosis at L2-L3 and mild spinal canal  stenosis at L3-L4.  Rotatory levo scoliosis of the lumbar spine.    Insurance   COPAY 0 (0)  Visit number: 7  Authorization info: NO AUTH REQ   Insurance Type: MEDICARE/ [employer]  Precautions     Pain  Pain Assessment: 0-10  Pain Score: 3  Pain Location: Back  Pain Orientation: Mid  Pain Radiating Towards: Left thigh    Objective   Objective:  Slump (-)  Leg Lowering (+)     LUMBAR AROM  FF 20  EX 15  RR 10  LR 10  RSB 5  LSB 5     HIP AROM  R WFL  L WFL    Other Measures  Oswestry Disablity Index (CHAI): 8      Treatments:  (---) indicates NT  TM walking 1.7mph 5min ---NT   YAZMIN 3x10  L lateral shift corrections 2x10  Bridges 3x10  Prone SLR 2x10 BL  SLR supine/SL x15  Standing hip 3-way RTB x20  Clam shells RTB 3x10  Trunk RR/LR GTB 20x ea   Pallof Press GTB    Goals: Set and discussed today  Increase ROM to WFL of LB-NM  Increase Strength where deficits noted by 1/3 to 1 mm grade of abdominal mm core  abs 4/5  Ind w/ HEP to expedite progress and promote goal achievement. -MET  Improve Outcome score  for evidence of improved function.  Return to PLOF -NM  Decrease pain to 2/10 or less-NM    Assessment:  Pt able to tolerate tx session well this date w/ no adverse effects noted from tx.  Pt compliant w/ program and appears to understand rationale for therapy.  Still requires skilled therapy for increasing strength/ROM for performing ADLs.      Plan:  D/t noted impairments and dysfunction of  LB, PT will cont to address deficits of strength and ROM via therapeutic exs and modalities PRN and further assist w/ pain reduction.         OP EDUCATION:           no

## 2024-05-03 ENCOUNTER — TREATMENT (OUTPATIENT)
Dept: PHYSICAL THERAPY | Facility: CLINIC | Age: 69
End: 2024-05-03
Payer: MEDICARE

## 2024-05-03 DIAGNOSIS — M47.27 LUMBOSACRAL RADICULOPATHY DUE TO DEGENERATIVE JOINT DISEASE OF SPINE: Primary | ICD-10-CM

## 2024-05-03 PROCEDURE — 97110 THERAPEUTIC EXERCISES: CPT | Mod: GP

## 2024-05-03 ASSESSMENT — PAIN - FUNCTIONAL ASSESSMENT: PAIN_FUNCTIONAL_ASSESSMENT: 0-10

## 2024-05-03 ASSESSMENT — PAIN SCALES - GENERAL: PAINLEVEL_OUTOF10: 3

## 2024-05-06 ENCOUNTER — APPOINTMENT (OUTPATIENT)
Dept: ORTHOPEDIC SURGERY | Facility: CLINIC | Age: 69
End: 2024-05-06
Payer: MEDICARE

## 2024-05-10 ENCOUNTER — OFFICE VISIT (OUTPATIENT)
Dept: ORTHOPEDIC SURGERY | Facility: CLINIC | Age: 69
End: 2024-05-10
Payer: MEDICARE

## 2024-05-10 DIAGNOSIS — M54.16 LUMBAR RADICULOPATHY: Primary | ICD-10-CM

## 2024-05-10 DIAGNOSIS — M54.50 LOW BACK PAIN, UNSPECIFIED BACK PAIN LATERALITY, UNSPECIFIED CHRONICITY, UNSPECIFIED WHETHER SCIATICA PRESENT: ICD-10-CM

## 2024-05-10 PROCEDURE — 99214 OFFICE O/P EST MOD 30 MIN: CPT | Performed by: PHYSICIAN ASSISTANT

## 2024-05-10 PROCEDURE — 99215 OFFICE O/P EST HI 40 MIN: CPT | Performed by: PHYSICIAN ASSISTANT

## 2024-05-10 PROCEDURE — 1159F MED LIST DOCD IN RCRD: CPT | Performed by: PHYSICIAN ASSISTANT

## 2024-05-10 PROCEDURE — 1160F RVW MEDS BY RX/DR IN RCRD: CPT | Performed by: PHYSICIAN ASSISTANT

## 2024-05-10 NOTE — PROGRESS NOTES
Ridge Thomas is a 69 y.o. male who presents for New Patient Visit of the Lower Back (Sent by Dr. Haines/Left leg pain also/X-rays on his phone from chiropractor/MRI at ).    HPI:  New patient visit for low back pain and left leg pain.  He denies any fever chills nausea vomiting night sweats.  He has no bowel or bladder complaints.    Physical exam:  Well-nourished, well kept.No lymphangitis or lymphadenopathy in the examined extremities.  Gait normal.  Can stand on heels and toes.   Examination of the back shows tenderness in the paraspinous musculature.  There is mild decreased range of motion in all directions due to guarding/muscle spasms and pain at extremes.  There is good strength and no instability.  Examination of the lower extremities reveals no point tenderness, swelling, or deformity.  Range of motion of the hips, knees, and ankles are full without crepitance, instability, or exacerbation of pain, except internal/external rotation of his left leg reproduces a little bit of his back pain and just a small amount of hip pain.  Strength is 5/5 throughout.  No redness, abrasions, or lesions on extremities  Gross sensation intact in the extremities.  Deep tendon reflexes absent bilateral patella.  Clonus negative.  Affect normal.  Alert and oriented ×3.  Coordination normal.    Imaging studies:  An MRI of his lumbar spine from April 14, 2024 was reviewed.  He has x-rays on his phone from his chiropractor that I did review.    Assessment:  69-year-old gentleman here for new patient visit of low back and left leg pain.  Sent by Dr. Jose Carlos Haines.  He is having back pain and left leg radicular symptoms generally stopping at the knee.  This goes through the left low back left buttock and around to the lateral left thigh and down the quad on the left.  Nothing really below that.  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  His x-rays show about a 20 degree scoliosis, he has  got multiple level degenerative changes throughout, there is not appear to be any spondylolisthesis on the x-rays that I saw on his phone, but there were no flexion-extension views.  He did not want to get x-rays today.  The MRI shows multiple levels of degeneration and foraminal and central stenosis.  I think the worst levels are L2-3 L3-4 L4-5 with L4-5 probably being the worst with a lot of left-sided foraminal narrowing and thickening of the ligaments with moderate to severe central canal stenosis.  I think most of his problem is L4-5, but L2-3, L3-4 may be contributing.  He did some chiropractic care, but he did not get great results.  He has done 1 full course of physical therapy, he has had steroids from Dr. Haines, and he is doing another round of therapy now.  We did discuss options today.  He has never had surgery on his back, he has never had injections.    Plan:  He does not want to think about doing anything right now, this time of year is his busiest time as a pool specialist.  We did discuss surgical options and other nonsurgical options such as pain management.  He would like to try some therapy again first and just work through his pain for now.  I will see him back in 6 weeks and see how he is doing.    I reviewed test today, MRI of the lumbar spine.  I did discuss this case personally with Dr. Jose Carlos Haines, I reviewed the notes from Dr. Haines from March 1, 2024.  We did consider and discuss surgery today.  This is a patient with an exacerbation of a chronic problem that is affecting his bodily function.    Cristhian Smallwood PA-C

## 2024-05-15 ENCOUNTER — TREATMENT (OUTPATIENT)
Dept: PHYSICAL THERAPY | Facility: CLINIC | Age: 69
End: 2024-05-15
Payer: MEDICARE

## 2024-05-15 DIAGNOSIS — M47.27 LUMBOSACRAL RADICULOPATHY DUE TO DEGENERATIVE JOINT DISEASE OF SPINE: Primary | ICD-10-CM

## 2024-05-15 PROCEDURE — 97110 THERAPEUTIC EXERCISES: CPT | Mod: GP,CQ

## 2024-05-15 NOTE — PROGRESS NOTES
"Physical Therapy Treatment    Patient Name: Ridge Thomas  MRN: [id number]  Today's Date: 5/15/2024  Time Calculation  Start Time: 0830  Stop Time: 0908  Time Calculation (min): 38 min  PT Therapeutic Procedures Time Entry  Therapeutic Exercise Time Entry: 38       Insurance   COPAY 0 (0)  Visit number: 8  Authorization info: NO AUTH REQ   Insurance Type: MEDICARE/ [employer]    Current Problem  1. Lumbosacral radiculopathy due to degenerative joint disease of spine            Subjective   General   Pt. Reports his pain is the same coming in and he still gets relief w/ just lying down.   Precautions     Pain       Objective   Treatments:  DARIO 2'  Prone press ups 2x10  YAZMIN 3x10  L lateral shift corrections 4x10  Bridges 2x10  Prone SLR 1x10 BL  SLR supine x10  Standing hip 3-way RTB x20  PB push into thighs (hooklying) 3\" x20    Assessment:   Pt. Still not having any lasting changes in symptoms. Pt. Has relief from symptoms while performing exercises, but as soon as he stands up and walks for a bit his L LE, down to his knee, has a burning feeling. Pt. Will continue w/ current HEP.     Plan:   Continue to focus on increasing strength and decreasing symptoms.     OP EDUCATION:       Goals:     "

## 2024-05-16 NOTE — PROGRESS NOTES
Physical Therapy Treatment    Patient Name: Ridge Thomas  MRN: [id number]  Today's Date: 5/16/2024             Current Problem  1. Lumbosacral radiculopathy due to degenerative joint disease of spine            Subjective   General     Insurance   COPAY 0 (0)  Visit number: 9  Authorization info: NO AUTH REQ   Insurance Type: MEDICARE/ [employer]  Precautions     Pain       Objective   Treatments:  TM walking 1.7mph 5min NT   YAZMIN 3x10  L lateral shift corrections 2x10  Bridges 2x10  SLR supine/prone/SL x15  Standing hip 3-way RTB x20  Clam shells RTB 2 x 10  Trunk RR/LR GTB 20x ea   Pallof Press GTB     Assessment     Plan:  Progress with POC as tolerated.    OP EDUCATION:       Goals:

## 2024-05-17 ENCOUNTER — APPOINTMENT (OUTPATIENT)
Dept: PHYSICAL THERAPY | Facility: CLINIC | Age: 69
End: 2024-05-17
Payer: MEDICARE

## 2024-05-17 DIAGNOSIS — M47.27 LUMBOSACRAL RADICULOPATHY DUE TO DEGENERATIVE JOINT DISEASE OF SPINE: Primary | ICD-10-CM

## 2024-05-21 ENCOUNTER — TREATMENT (OUTPATIENT)
Dept: PHYSICAL THERAPY | Facility: CLINIC | Age: 69
End: 2024-05-21
Payer: MEDICARE

## 2024-05-21 DIAGNOSIS — M47.27 LUMBOSACRAL RADICULOPATHY DUE TO DEGENERATIVE JOINT DISEASE OF SPINE: Primary | ICD-10-CM

## 2024-05-21 PROCEDURE — 97110 THERAPEUTIC EXERCISES: CPT | Mod: GP,CQ,KX

## 2024-05-21 ASSESSMENT — PAIN SCALES - GENERAL: PAINLEVEL_OUTOF10: 3

## 2024-05-21 ASSESSMENT — PAIN - FUNCTIONAL ASSESSMENT: PAIN_FUNCTIONAL_ASSESSMENT: 0-10

## 2024-05-21 NOTE — PROGRESS NOTES
"Physical Therapy Treatment    Patient Name: Ridge Thomas  MRN: [id number]  Today's Date: 5/21/2024  Time Calculation  Start Time: 0758  Stop Time: 0836  Time Calculation (min): 38 min     PT Therapeutic Procedures Time Entry  Therapeutic Exercise Time Entry: 38                 Insurance:   COPAY 0 (0)  Visit number: 9  Authorization info: NO AUTH REQ   Insurance Type: MEDICARE/ [facility]  Assessment:   Progressed his program today w/ addition of Supine Marching w/ TA and TB Chops 3 way. Emphasis on slow controlled pace and core activation w/ good jaguar. Some fatigue w/ addition of chops but pt did not report any increased LBP or LLE pain. However he did experience increased LLE pain when performing stdg tband hip abd and ext (when WB on that side). Pt encouraged to cont avoiding activities that cause pain which pt states are bending and twisting movements.   Plan:   Continue to focus on increasing strength and decreasing symptoms.     Current Problem  1. Lumbosacral radiculopathy due to degenerative joint disease of spine            Subjective   General   Pt reports compliance w/ HEP 1x/day. Is noticing \"some\" improvements regarding his back but does still c/o LBP and LLE pain.   Precautions       Pain  Pain Assessment: (P) 0-10  Pain Score: (P) 3  Pain Location: (P) Back  Pain Orientation: (P) Mid  Pain Radiating Towards: (P) Left Thigh    Objective       Treatments:  Therapeutic Exercise (86356):  DARIO 2'  Prone press ups 2x10  YAZMIN 3x10  L lateral shift corrections 4x10  Bridges 2x10  Prone SLR 1x10 BL  SLR supine x10  Supine Marching w/ TA x10   Standing hip 3-way RTB x20  PB push into thighs (hooklying) 3\" x20   TB Chops 3 way GTB x15 ea      EDUCATION:   Patient educated on the importance of posture, positioning, body mechanics and healing/recovery time. Also instructed pt on appropriate exercise, staying within pain free range and technique/pacing. Pt demo's good understanding.         "

## 2024-05-23 NOTE — PROGRESS NOTES
"Physical Therapy Treatment    Patient Name: Ridge Thomas  MRN: [id number]  Today's Date: 5/24/2024  Time Calculation  Start Time: 0820  Stop Time: 0902  Time Calculation (min): 42 min     Insurance:   COPAY 0 (0)  Visit number: 10  Authorization info: NO AUTH REQ   Insurance Type: MEDICARE/ [employer]    Current Problem  1. Lumbosacral radiculopathy due to degenerative joint disease of spine              Subjective   General   Pt reports feeling better and feels the exs are helping him,   Precautions       Pain  Pain Assessment: 0-10  Pain Location: Back  Pain Orientation: Mid  Pain Radiating Towards: left thigh    Objective       Treatments:  Therapeutic Exercise (42227):  MARIAM 6'  DARIO 3'  Prone press ups 3x10  YAZMIN 3x10---  L lateral shift corrections 4x10---  Bridges 3x10  Prone SLR 3x10 BL  SLR supine 2x10 BL  Supine Marching w/ TA x20   Standing hip 3-way RTB x20---  PB push into thighs (hooklying) 3\" x20 ---  TB Chops 3 way GTB x20 ea       EDUCATION:   Patient educated on the importance of cont'd compliance w/ HEP and monitoring activity to overuse back.    Assessment:  Pt able to tolerate tx session well this date w/ no adverse effects noted from tx.  Pt compliant w/ program and appears to understand rationale for therapy.  Still requires skilled therapy for increasing strength/ROM for performing ADLs.      Plan:  D/t noted impairments and dysfunction of  low back, PT will cont to address deficits of strength and ROM via therapeutic exs and modalities PRN and further assist w/ pain reduction.           "

## 2024-05-24 ENCOUNTER — TREATMENT (OUTPATIENT)
Dept: PHYSICAL THERAPY | Facility: CLINIC | Age: 69
End: 2024-05-24
Payer: MEDICARE

## 2024-05-24 DIAGNOSIS — M47.27 LUMBOSACRAL RADICULOPATHY DUE TO DEGENERATIVE JOINT DISEASE OF SPINE: Primary | ICD-10-CM

## 2024-05-24 PROCEDURE — 97110 THERAPEUTIC EXERCISES: CPT | Mod: GP

## 2024-05-24 ASSESSMENT — PAIN - FUNCTIONAL ASSESSMENT: PAIN_FUNCTIONAL_ASSESSMENT: 0-10

## 2024-05-28 NOTE — PROGRESS NOTES
"Physical Therapy Treatment    Patient Name: Ridge Thomas  MRN: [id number]  Today's Date: 5/29/2024  Time Calculation  Start Time: 0903  Stop Time: 0942  Time Calculation (min): 39 min     Insurance:   COPAY 0 (0)  Visit number: 11  Authorization info: NO AUTH REQ   Insurance Type: MEDICARE/ [employer]    Current Problem  1. Lumbar pain with radiation down left leg                Subjective   General   Pt reports pain is diminishing and that he does his exs first thing in the AM.  Precautions       Pain  Pain Assessment: 0-10  Pain Score: 2  Pain Location: Back  Pain Orientation: Mid  Pain Radiating Towards: Left Thigh    Objective       Treatments:  Therapeutic Exercise (00630):  MARIAM 6'  YAZMIN 20x  Standing Back SB 15x   DARIO 3'  Prone press ups 3x10  Prone SLR 3x10 BL  L lateral shift corrections 4x10---  Bridges on PB 3x10  Hip/Knee Flex  on PB 20x  Crunches 2x10  SLR supine 2x10 BL  Supine Marching w/ TA x20---  Standing hip 3-way RTB x20---  PB push into thighs (hooklying) 3\" x20 ---  TB Chops 3 way GTB x20 ea---       EDUCATION:   Patient educated on the importance of posture while driving and adjusting seat and back of seat to provide better support for back.    Assessment:  Pt able to tolerate tx session well this date w/ no adverse effects noted from tx.  Pt compliant w/ program and appears to understand rationale for therapy.  Still requires skilled therapy for increasing strength/ROM for performing ADLs.      Plan:  D/t noted impairments and dysfunction of  low back, PT will cont to address deficits of strength and ROM via therapeutic exs and modalities PRN and further assist w/ pain reduction.           "

## 2024-05-29 ENCOUNTER — TREATMENT (OUTPATIENT)
Dept: PHYSICAL THERAPY | Facility: CLINIC | Age: 69
End: 2024-05-29
Payer: MEDICARE

## 2024-05-29 DIAGNOSIS — M54.50 LUMBAR PAIN WITH RADIATION DOWN LEFT LEG: Primary | ICD-10-CM

## 2024-05-29 DIAGNOSIS — M79.605 LUMBAR PAIN WITH RADIATION DOWN LEFT LEG: Primary | ICD-10-CM

## 2024-05-29 PROCEDURE — 97110 THERAPEUTIC EXERCISES: CPT | Mod: GP

## 2024-05-29 ASSESSMENT — PAIN - FUNCTIONAL ASSESSMENT: PAIN_FUNCTIONAL_ASSESSMENT: 0-10

## 2024-05-29 ASSESSMENT — PAIN SCALES - GENERAL: PAINLEVEL_OUTOF10: 2

## 2024-05-29 NOTE — PROGRESS NOTES
Physical Therapy Treatment    Patient Name: Ridge Thomas  MRN: [id number]  Today's Date: 5/31/2024  Time Calculation  Start Time: 0858  Stop Time: 0939  Time Calculation (min): 41 min     Insurance:   COPAY 0 (0)  Visit number: 12  Authorization info: NO AUTH REQ   Insurance Type: MEDICARE/ [employer]    Current Problem  1. Lumbar pain with radiation down left leg              Subjective   General   Pt feels a flare up this AM.  Precautions       Pain  Pain Assessment: 0-10  Pain Score: 5 - Moderate pain  Pain Location: Back  Pain Orientation: Left  Pain Radiating Towards: Left Thigh    Objective       Treatments:  Therapeutic Exercise (05831):  MARIAM 6'    Access Code: [email or web address]   Exercises  - Supine Lower Trunk Rotation  - 2 x daily - 5 x weekly - 1 sets - 10-15 reps - 10 hold  - Supine Lower Trunk Rotation (Mirrored)  - 2 x daily - 5 x weekly - 1 sets - 10-15 reps - 10 hold  - Supine Piriformis Stretch with Foot on Ground  - 2 x daily - 5 x weekly - 1 sets - 5 reps - 30 hold  - Supine Piriformis Stretch with Foot on Ground (Mirrored)  - 2 x daily - 5 x weekly - 1 sets - 5 reps - 30 hold  - Sidelying ITB Stretch off Table  - 2 x daily - 5 x weekly - 1 sets - 5 reps - 30 hold  - Sidelying ITB Stretch off Table (Mirrored)  - 2 x daily - 5 x weekly - 1 sets - 5 reps - 30 hold  - ITB Stretch at Wall  - 2 x daily - 5 x weekly - 1 sets - 5 reps - 30 hold  - ITB Stretch at Wall (Mirrored)  - 2 x daily - 5 x weekly - 1 sets - 5 reps - 30 hold  - Seated Hamstring Stretch  - 2 x daily - 5 x weekly - 1 sets - 3 reps - 30 hold  - Seated Hamstring Stretch (Mirrored)  - 2 x daily - 5 x weekly - 1 sets - 3 reps - 30 hold  - Prone Quadriceps Stretch w/ strap - 2 x daily - 5 x weekly - 1 sets - 5 reps - 30 hold  - Prone Quadriceps Stretch w/ strap (Mirrored)  - 2 x daily - 5 x weekly - 1 sets - 5 reps - 30 hold    Prone press ups 3x10    NT---(resume as appropriate)  YAZMIN 20x   Standing Back SB 15x   DARIO 3'    Prone SLR 3x10 BL  L  "lateral shift corrections 4x10  Bridges on PB 3x10  Hip/Knee Flex  on PB 20x  Crunches 2x10  SLR supine 2x10 BL  Supine Marching w/ TA x20  Standing hip 3-way RTB x20  PB push into thighs (hooklying) 3\" x20   TB Chops 3 way GTB x20 ea       EDUCATION:   Patient educated on stretching program as noted above.    Assessment:  Pt able to tolerate tx session well this date w/ no adverse effects noted from tx.  Pt compliant w/ program and appears to understand rationale for therapy.  Still requires skilled therapy for increasing strength/ROM for performing ADLs.      Plan:  D/t noted impairments and dysfunction of  low back, PT will cont to address deficits of strength and ROM via therapeutic exs and modalities PRN and further assist w/ pain reduction.           "

## 2024-05-31 ENCOUNTER — TREATMENT (OUTPATIENT)
Dept: PHYSICAL THERAPY | Facility: CLINIC | Age: 69
End: 2024-05-31
Payer: MEDICARE

## 2024-05-31 DIAGNOSIS — M79.605 LUMBAR PAIN WITH RADIATION DOWN LEFT LEG: Primary | ICD-10-CM

## 2024-05-31 DIAGNOSIS — M54.50 LUMBAR PAIN WITH RADIATION DOWN LEFT LEG: Primary | ICD-10-CM

## 2024-05-31 PROCEDURE — 97110 THERAPEUTIC EXERCISES: CPT | Mod: GP

## 2024-05-31 ASSESSMENT — PAIN SCALES - GENERAL: PAINLEVEL_OUTOF10: 5 - MODERATE PAIN

## 2024-05-31 ASSESSMENT — PAIN - FUNCTIONAL ASSESSMENT: PAIN_FUNCTIONAL_ASSESSMENT: 0-10

## 2024-06-04 ENCOUNTER — APPOINTMENT (OUTPATIENT)
Dept: PHYSICAL THERAPY | Facility: CLINIC | Age: 69
End: 2024-06-04
Payer: MEDICARE

## 2024-06-05 NOTE — PROGRESS NOTES
"Physical Therapy Treatment    Patient Name: Ridge Thomas  MRN: [id number]  Today's Date: 6/7/2024  Time Calculation  Start Time: 0907  Stop Time: 0949  Time Calculation (min): 42 min     Insurance:   COPAY 0 (0)  Visit number: 13  Authorization info: NO AUTH REQ   Insurance Type: MEDICARE/ [employer]    Current Problem  1. Lumbar pain with radiation down left leg                Subjective   General   Pt feels better today.  Precautions       Pain  Pain Assessment: 0-10  Pain Score: 4  Pain Location: Back  Pain Orientation: Left  Pain Radiating Towards: left thigh    Objective       Treatments:  Therapeutic Exercise (04868):  MARIAM 8'  PB FF/Side Flex 20x ea.  Bridges on PB 3x10   PB LTR 20x  SLR supine 2x10 BL  Crunches 2x10   SLR Hip AB 2x10 BL  DARIO 3'         NT---(resume as appropriate)  YAZMIN 20x   Standing Back SB 15x   Prone press ups 3x10  Prone SLR 3x10 BL  L lateral shift corrections 4x10  Hip/Knee Flex  on PB 20x  Supine Marching w/ TA x20  Standing hip 3-way RTB x20  PB push into thighs (hooklying) 3\" x20   TB Chops 3 way GTB x20 ea    Previous visit--  Access Code: [email or web address]   Exercises  - Supine Lower Trunk Rotation  - 2 x daily - 5 x weekly - 1 sets - 10-15 reps - 10 hold  - Supine Lower Trunk Rotation (Mirrored)  - 2 x daily - 5 x weekly - 1 sets - 10-15 reps - 10 hold  - Supine Piriformis Stretch with Foot on Ground  - 2 x daily - 5 x weekly - 1 sets - 5 reps - 30 hold  - Supine Piriformis Stretch with Foot on Ground (Mirrored)  - 2 x daily - 5 x weekly - 1 sets - 5 reps - 30 hold  - Sidelying ITB Stretch off Table  - 2 x daily - 5 x weekly - 1 sets - 5 reps - 30 hold  - Sidelying ITB Stretch off Table (Mirrored)  - 2 x daily - 5 x weekly - 1 sets - 5 reps - 30 hold  - ITB Stretch at Wall  - 2 x daily - 5 x weekly - 1 sets - 5 reps - 30 hold  - ITB Stretch at Wall (Mirrored)  - 2 x daily - 5 x weekly - 1 sets - 5 reps - 30 hold  - Seated Hamstring Stretch  - 2 x daily - 5 x weekly - 1 sets - 3 reps - " 30 hold  - Seated Hamstring Stretch (Mirrored)  - 2 x daily - 5 x weekly - 1 sets - 3 reps - 30 hold  - Prone Quadriceps Stretch w/ strap - 2 x daily - 5 x weekly - 1 sets - 5 reps - 30 hold  - Prone Quadriceps Stretch w/ strap (Mirrored)  - 2 x daily - 5 x weekly - 1 sets - 5 reps - 30 hold     EDUCATION:   Patient educated on stretching program as noted above.    Assessment:  Pt able to tolerate tx session well this date w/ no adverse effects noted from tx.  Pt compliant w/ program and appears to understand rationale for therapy.  Still requires skilled therapy for increasing strength/ROM for performing ADLs.      Plan:  D/t noted impairments and dysfunction of  low back, PT will cont to address deficits of strength and ROM via therapeutic exs and modalities PRN and further assist w/ pain reduction.

## 2024-06-07 ENCOUNTER — TREATMENT (OUTPATIENT)
Dept: PHYSICAL THERAPY | Facility: CLINIC | Age: 69
End: 2024-06-07
Payer: MEDICARE

## 2024-06-07 DIAGNOSIS — M54.50 LUMBAR PAIN WITH RADIATION DOWN LEFT LEG: Primary | ICD-10-CM

## 2024-06-07 DIAGNOSIS — M79.605 LUMBAR PAIN WITH RADIATION DOWN LEFT LEG: Primary | ICD-10-CM

## 2024-06-07 PROCEDURE — 97110 THERAPEUTIC EXERCISES: CPT | Mod: GP

## 2024-06-07 ASSESSMENT — PAIN - FUNCTIONAL ASSESSMENT: PAIN_FUNCTIONAL_ASSESSMENT: 0-10

## 2024-06-07 ASSESSMENT — PAIN SCALES - GENERAL: PAINLEVEL_OUTOF10: 4

## 2024-06-11 ENCOUNTER — APPOINTMENT (OUTPATIENT)
Dept: PHYSICAL THERAPY | Facility: CLINIC | Age: 69
End: 2024-06-11
Payer: MEDICARE

## 2024-06-11 NOTE — PROGRESS NOTES
"Physical Therapy Treatment    Patient Name: Ridge Thomas  MRN: [id number]  Today's Date: 6/14/2024  Time Calculation  Start Time: 0902  Stop Time: 0941  Time Calculation (min): 39 min   Ther ex 39 min  Insurance:   COPAY 0 (0)  Visit number: 14  Authorization info: NO AUTH REQ   Insurance Type: MEDICARE/ MMOSUP    Current Problem  1. Lumbar pain with radiation down left leg                  Subjective   General   Pt notes still having some discomfort on occasion w/ some activities, but feels better overall.  Precautions       Pain  Pain Assessment: 0-10  Pain Score: 4  Pain Location: Back  Pain Orientation: Left  Pain Radiating Towards: Left Thigh    Objective       Treatments:  Therapeutic Exercise (48054):  MARIAM 8'  PB FF/ R-L Side Flex 20x alternating btn ea  Bridges on PB 3x10   SLR supine 3x10 L  Crunches 2x10   SLR Hip AB 3x10 L  DARIO 3'   Prone press ups 3x10  YAZMIN 20x    NT---(resume as appropriate)    Standing Back SB 15x   PB LTR 20x  Prone SLR 3x10 BL  L lateral shift corrections 4x10  Hip/Knee Flex  on PB 20x  Supine Marching w/ TA x20  Standing hip 3-way RTB x20  PB push into thighs (hooklying) 3\" x20   TB Chops 3 way GTB x20 ea    Previous visit--  Access Code: [email or web address]   Exercises  - Supine Lower Trunk Rotation  - 2 x daily - 5 x weekly - 1 sets - 10-15 reps - 10 hold  - Supine Lower Trunk Rotation (Mirrored)  - 2 x daily - 5 x weekly - 1 sets - 10-15 reps - 10 hold  - Supine Piriformis Stretch with Foot on Ground  - 2 x daily - 5 x weekly - 1 sets - 5 reps - 30 hold  - Supine Piriformis Stretch with Foot on Ground (Mirrored)  - 2 x daily - 5 x weekly - 1 sets - 5 reps - 30 hold  - Sidelying ITB Stretch off Table  - 2 x daily - 5 x weekly - 1 sets - 5 reps - 30 hold  - Sidelying ITB Stretch off Table (Mirrored)  - 2 x daily - 5 x weekly - 1 sets - 5 reps - 30 hold  - ITB Stretch at Wall  - 2 x daily - 5 x weekly - 1 sets - 5 reps - 30 hold  - ITB Stretch at Wall (Mirrored)  - 2 x daily - 5 x " weekly - 1 sets - 5 reps - 30 hold  - Seated Hamstring Stretch  - 2 x daily - 5 x weekly - 1 sets - 3 reps - 30 hold  - Seated Hamstring Stretch (Mirrored)  - 2 x daily - 5 x weekly - 1 sets - 3 reps - 30 hold  - Prone Quadriceps Stretch w/ strap - 2 x daily - 5 x weekly - 1 sets - 5 reps - 30 hold  - Prone Quadriceps Stretch w/ strap (Mirrored)  - 2 x daily - 5 x weekly - 1 sets - 5 reps - 30 hold     EDUCATION:   Patient educated on stretching program as noted above.    Assessment:  Pt able to tolerate tx session well this date w/ no adverse effects noted from tx.  Pt compliant w/ program and appears to understand rationale for therapy.  Still requires skilled therapy for increasing strength/ROM for performing ADLs.      Plan:  D/t noted impairments and dysfunction of  low back, PT will cont to address deficits of strength and ROM via therapeutic exs and modalities PRN and further assist w/ pain reduction.

## 2024-06-12 ENCOUNTER — LAB (OUTPATIENT)
Dept: LAB | Facility: LAB | Age: 69
End: 2024-06-12
Payer: MEDICARE

## 2024-06-12 ENCOUNTER — APPOINTMENT (OUTPATIENT)
Dept: GASTROENTEROLOGY | Facility: CLINIC | Age: 69
End: 2024-06-12
Payer: MEDICARE

## 2024-06-12 VITALS
DIASTOLIC BLOOD PRESSURE: 91 MMHG | SYSTOLIC BLOOD PRESSURE: 145 MMHG | OXYGEN SATURATION: 93 % | WEIGHT: 215 LBS | HEIGHT: 72 IN | BODY MASS INDEX: 29.12 KG/M2 | HEART RATE: 71 BPM | RESPIRATION RATE: 16 BRPM

## 2024-06-12 DIAGNOSIS — K62.5 RECTAL BLEEDING: ICD-10-CM

## 2024-06-12 DIAGNOSIS — E78.5 DYSLIPIDEMIA: ICD-10-CM

## 2024-06-12 DIAGNOSIS — K62.5 RECTAL BLEEDING: Primary | ICD-10-CM

## 2024-06-12 DIAGNOSIS — R19.5 POSITIVE COLORECTAL CANCER SCREENING USING COLOGUARD TEST: ICD-10-CM

## 2024-06-12 LAB
ALBUMIN SERPL BCP-MCNC: 4.6 G/DL (ref 3.4–5)
ALP SERPL-CCNC: 53 U/L (ref 33–136)
ALT SERPL W P-5'-P-CCNC: 19 U/L (ref 10–52)
ANION GAP SERPL CALC-SCNC: 12 MMOL/L (ref 10–20)
AST SERPL W P-5'-P-CCNC: 17 U/L (ref 9–39)
BASOPHILS # BLD AUTO: 0.05 X10*3/UL (ref 0–0.1)
BASOPHILS NFR BLD AUTO: 0.6 %
BILIRUB SERPL-MCNC: 2.2 MG/DL (ref 0–1.2)
BUN SERPL-MCNC: 14 MG/DL (ref 6–23)
CALCIUM SERPL-MCNC: 9.7 MG/DL (ref 8.6–10.3)
CHLORIDE SERPL-SCNC: 103 MMOL/L (ref 98–107)
CHOLEST SERPL-MCNC: 221 MG/DL (ref 0–199)
CHOLESTEROL/HDL RATIO: 4.4
CO2 SERPL-SCNC: 28 MMOL/L (ref 21–32)
CREAT SERPL-MCNC: 0.98 MG/DL (ref 0.5–1.3)
EGFRCR SERPLBLD CKD-EPI 2021: 83 ML/MIN/1.73M*2
EOSINOPHIL # BLD AUTO: 0.07 X10*3/UL (ref 0–0.7)
EOSINOPHIL NFR BLD AUTO: 0.8 %
ERYTHROCYTE [DISTWIDTH] IN BLOOD BY AUTOMATED COUNT: 13.3 % (ref 11.5–14.5)
GLUCOSE SERPL-MCNC: 91 MG/DL (ref 74–99)
HCT VFR BLD AUTO: 47.7 % (ref 41–52)
HDLC SERPL-MCNC: 49.8 MG/DL
HGB BLD-MCNC: 15.4 G/DL (ref 13.5–17.5)
IMM GRANULOCYTES # BLD AUTO: 0.03 X10*3/UL (ref 0–0.7)
IMM GRANULOCYTES NFR BLD AUTO: 0.3 % (ref 0–0.9)
LDLC SERPL CALC-MCNC: 151 MG/DL
LYMPHOCYTES # BLD AUTO: 2.48 X10*3/UL (ref 1.2–4.8)
LYMPHOCYTES NFR BLD AUTO: 27.6 %
MCH RBC QN AUTO: 29.3 PG (ref 26–34)
MCHC RBC AUTO-ENTMCNC: 32.3 G/DL (ref 32–36)
MCV RBC AUTO: 91 FL (ref 80–100)
MONOCYTES # BLD AUTO: 0.72 X10*3/UL (ref 0.1–1)
MONOCYTES NFR BLD AUTO: 8 %
NEUTROPHILS # BLD AUTO: 5.64 X10*3/UL (ref 1.2–7.7)
NEUTROPHILS NFR BLD AUTO: 62.7 %
NON HDL CHOLESTEROL: 171 MG/DL (ref 0–149)
NRBC BLD-RTO: 0 /100 WBCS (ref 0–0)
PLATELET # BLD AUTO: 286 X10*3/UL (ref 150–450)
POTASSIUM SERPL-SCNC: 4.5 MMOL/L (ref 3.5–5.3)
PROT SERPL-MCNC: 7 G/DL (ref 6.4–8.2)
RBC # BLD AUTO: 5.26 X10*6/UL (ref 4.5–5.9)
SODIUM SERPL-SCNC: 138 MMOL/L (ref 136–145)
TRIGL SERPL-MCNC: 103 MG/DL (ref 0–149)
VLDL: 21 MG/DL (ref 0–40)
WBC # BLD AUTO: 9 X10*3/UL (ref 4.4–11.3)

## 2024-06-12 PROCEDURE — 80053 COMPREHEN METABOLIC PANEL: CPT

## 2024-06-12 PROCEDURE — 1036F TOBACCO NON-USER: CPT | Performed by: STUDENT IN AN ORGANIZED HEALTH CARE EDUCATION/TRAINING PROGRAM

## 2024-06-12 PROCEDURE — 80061 LIPID PANEL: CPT

## 2024-06-12 PROCEDURE — 1159F MED LIST DOCD IN RCRD: CPT | Performed by: STUDENT IN AN ORGANIZED HEALTH CARE EDUCATION/TRAINING PROGRAM

## 2024-06-12 PROCEDURE — 36415 COLL VENOUS BLD VENIPUNCTURE: CPT

## 2024-06-12 PROCEDURE — 85025 COMPLETE CBC W/AUTO DIFF WBC: CPT

## 2024-06-12 PROCEDURE — 99204 OFFICE O/P NEW MOD 45 MIN: CPT | Performed by: STUDENT IN AN ORGANIZED HEALTH CARE EDUCATION/TRAINING PROGRAM

## 2024-06-12 NOTE — PROGRESS NOTES
"CC: Positive Cologuard test.    History of Present Illness:   Ridge Thomas is a 69 y.o. male with a PMH of overweight, hemorrhoids, lumbar stenosis who presents to clinic for positive Cologuard.  Occasional hemorrhoidal bleeding.  Last colonoscopy was in 2005. (performed for rectal bleeding).  No other GI complaints.  Works for [employer].  Patient is very busy with work.  He does not want to do his colonoscopy until later this summer or fall, due to his work schedule.  Patient just recently established with a PCP.  He had not seen a doctor for many years.      Review of Systems  ROS Negative unless otherwise stated above.      Social History   reports that he has never smoked. He has never used smokeless tobacco.     Family History  No family history of colon cancer.  Parents lived into their 90s.    Allergies  No Known Allergies    Medications  Current Outpatient Medications   Medication Instructions    clobetasol (Temovate) 0.05 % cream Topical, 2 times daily    cyclobenzaprine (FLEXERIL) 10 mg, oral, Nightly PRN        Objective   Visit Vitals  BP (!) 145/91   Pulse 71   Resp 16        General: A&Ox3, NAD.  HEENT: AT/NC.   CV: RRR.   Resp: CTA bilaterally.   GI: Soft, NT/ND.  Extrem: No edema.   Skin: No Jaundice.   Neuro: No focal deficits.   Psych: Normal mood and affect.     No results found for: \"WBC\", \"HGB\", \"HCT\", \"MCV\", \"PLT\"    Chemistry    Lab Results   Component Value Date/Time     03/29/2024 0902    K 4.5 03/29/2024 0902     03/29/2024 0902    CO2 27 03/29/2024 0902    BUN 18 03/29/2024 0902    CREATININE 0.92 03/29/2024 0902    Lab Results   Component Value Date/Time    CALCIUM 9.8 03/29/2024 0902    ALKPHOS 54 03/29/2024 0902    AST 15 03/29/2024 0902    ALT 21 03/29/2024 0902    BILITOT 2.2 (H) 03/29/2024 0902             ASSESSMENT/PLAN  Ridge Thomas is a 69 y.o. male with a PMH of overweight, hemorrhoids, lumbar stenosis who presents to clinic for positive Cologuard. Occasional " rectal bleeding.     -Obtain CBC.  -Obtain colonoscopy at [facility] (discussed the risks of waiting months to get the colonoscopy, patient voiced understanding).       Ankur Courtney, DO

## 2024-06-14 ENCOUNTER — APPOINTMENT (OUTPATIENT)
Dept: PHYSICAL THERAPY | Facility: CLINIC | Age: 69
End: 2024-06-14
Payer: MEDICARE

## 2024-06-14 DIAGNOSIS — M79.605 LUMBAR PAIN WITH RADIATION DOWN LEFT LEG: Primary | ICD-10-CM

## 2024-06-14 DIAGNOSIS — M54.50 LUMBAR PAIN WITH RADIATION DOWN LEFT LEG: Primary | ICD-10-CM

## 2024-06-14 PROCEDURE — 97110 THERAPEUTIC EXERCISES: CPT | Mod: GP

## 2024-06-14 ASSESSMENT — PAIN - FUNCTIONAL ASSESSMENT: PAIN_FUNCTIONAL_ASSESSMENT: 0-10

## 2024-06-14 ASSESSMENT — PAIN SCALES - GENERAL: PAINLEVEL_OUTOF10: 4

## 2024-06-18 ENCOUNTER — APPOINTMENT (OUTPATIENT)
Dept: PHYSICAL THERAPY | Facility: CLINIC | Age: 69
End: 2024-06-18
Payer: MEDICARE

## 2024-06-18 NOTE — PROGRESS NOTES
"  Physical Therapy Treatment    Patient Name: Ridge Thomas  MRN: [id number]  Today's Date: 6/21/2024  Time Calculation  Start Time: 0950  Stop Time: 1030  Time Calculation (min): 40 min     PT Therapeutic Procedures Time Entry  Therapeutic Exercise Time Entry: 40                   Current Problem  1. Lumbar pain with radiation down left leg            Insurance:   COPAY 0 (0)  Visit number: 15  Authorization info: NO AUTH REQ   Insurance Type: MEDICARE/ MMOSU    Precautions       Subjective   Pt states he feels the same , but overall improvement  Pain  Pain Assessment: 0-10  0-10 (Numeric) Pain Score: 3  Pain Location: Back  Pain Orientation: Left      Objective     LUMBAR AROM3/13/24... 6/21/2024  FF 5... 65  EX 10... 30  RR 5... 5  LR 5... 5  RSB 5... 20  LSB 5... 30       Treatments:  Therapeutic Exercise (30902):  MARIAM 7'  PB FF/ R-L Side Flex 20x alternating btn ea---  Bridges on PB 3x10   PB Leg Roll outs 20x  SLR supine 3x10 L---  Crunches 2x10 ---  SLR Hip AB 3x10 L----  DARIO 3' ---  Prone press ups 3x10---  YAZMIN 20x  Squats 2x10 @ // bars  SLS on Blue Foam Oval 10x10s BL  Body Blade 1 min BL UE    NT---(resume as appropriate)    Standing Back SB 15x   PB LTR 20x  Prone SLR 3x10 BL  L lateral shift corrections 4x10  Hip/Knee Flex  on PB 20x  Supine Marching w/ TA x20  Standing hip 3-way RTB x20  PB push into thighs (hooklying) 3\" x20   TB Chops 3 way GTB x20 ea    Goals:   Increase ROM to WFL of LB-MET  Increase Strength where deficits noted by 1/3 to 1 mm grade of abdominal mm-MET  Ind w/ HEP to expedite progress and promote goal achievement.-NM    Improve Outcome score to < or equal to 10% for evidence of improved function.-MET  Return to PLOF -NM  Decrease pain to 2/10 or less-NM    Assessment:  Pt able to tolerate tx session well this date w/ no adverse effects noted from tx.  Pt compliant w/ program and appears to understand rationale for therapy.  Still requires skilled therapy for increasing " strength/ROM for performing ADLs.      Plan:  Pt to see MD 6/28/24 and schedule PT 1x/wk after for follow up.             David Dietz, PT

## 2024-06-21 ENCOUNTER — APPOINTMENT (OUTPATIENT)
Dept: PHYSICAL THERAPY | Facility: CLINIC | Age: 69
End: 2024-06-21
Payer: MEDICARE

## 2024-06-21 DIAGNOSIS — M79.605 LUMBAR PAIN WITH RADIATION DOWN LEFT LEG: Primary | ICD-10-CM

## 2024-06-21 DIAGNOSIS — M54.50 LUMBAR PAIN WITH RADIATION DOWN LEFT LEG: Primary | ICD-10-CM

## 2024-06-21 PROCEDURE — 97110 THERAPEUTIC EXERCISES: CPT | Mod: GP

## 2024-06-21 ASSESSMENT — PAIN SCALES - GENERAL: PAINLEVEL_OUTOF10: 3

## 2024-06-21 ASSESSMENT — PAIN - FUNCTIONAL ASSESSMENT: PAIN_FUNCTIONAL_ASSESSMENT: 0-10

## 2024-06-28 ENCOUNTER — APPOINTMENT (OUTPATIENT)
Dept: ORTHOPEDIC SURGERY | Facility: CLINIC | Age: 69
End: 2024-06-28
Payer: MEDICARE

## 2024-07-02 ENCOUNTER — APPOINTMENT (OUTPATIENT)
Dept: PHYSICAL THERAPY | Facility: CLINIC | Age: 69
End: 2024-07-02
Payer: MEDICARE

## 2024-07-03 ENCOUNTER — APPOINTMENT (OUTPATIENT)
Dept: PHYSICAL THERAPY | Facility: CLINIC | Age: 69
End: 2024-07-03
Payer: MEDICARE

## 2024-07-12 ENCOUNTER — APPOINTMENT (OUTPATIENT)
Dept: PHYSICAL THERAPY | Facility: CLINIC | Age: 69
End: 2024-07-12
Payer: MEDICARE

## 2024-07-26 ENCOUNTER — APPOINTMENT (OUTPATIENT)
Dept: ORTHOPEDIC SURGERY | Facility: CLINIC | Age: 69
End: 2024-07-26
Payer: MEDICARE

## 2024-07-27 ENCOUNTER — ANESTHESIA EVENT (OUTPATIENT)
Dept: GASTROENTEROLOGY | Facility: EXTERNAL LOCATION | Age: 69
End: 2024-07-27

## 2024-08-06 ENCOUNTER — ANESTHESIA (OUTPATIENT)
Dept: GASTROENTEROLOGY | Facility: EXTERNAL LOCATION | Age: 69
End: 2024-08-06

## 2024-08-06 ENCOUNTER — APPOINTMENT (OUTPATIENT)
Dept: GASTROENTEROLOGY | Facility: EXTERNAL LOCATION | Age: 69
End: 2024-08-06
Payer: MEDICARE

## 2024-08-06 VITALS
SYSTOLIC BLOOD PRESSURE: 131 MMHG | HEART RATE: 60 BPM | OXYGEN SATURATION: 96 % | WEIGHT: 215 LBS | RESPIRATION RATE: 17 BRPM | TEMPERATURE: 96.8 F | BODY MASS INDEX: 29.16 KG/M2 | DIASTOLIC BLOOD PRESSURE: 85 MMHG

## 2024-08-06 DIAGNOSIS — R19.5 POSITIVE COLORECTAL CANCER SCREENING USING COLOGUARD TEST: ICD-10-CM

## 2024-08-06 PROCEDURE — 45385 COLONOSCOPY W/LESION REMOVAL: CPT | Performed by: STUDENT IN AN ORGANIZED HEALTH CARE EDUCATION/TRAINING PROGRAM

## 2024-08-06 RX ORDER — SODIUM CHLORIDE 9 MG/ML
20 INJECTION, SOLUTION INTRAVENOUS CONTINUOUS
Status: DISCONTINUED | OUTPATIENT
Start: 2024-08-06 | End: 2024-08-07 | Stop reason: HOSPADM

## 2024-08-06 RX ORDER — PROPOFOL 10 MG/ML
INJECTION, EMULSION INTRAVENOUS AS NEEDED
Status: DISCONTINUED | OUTPATIENT
Start: 2024-08-06 | End: 2024-08-06

## 2024-08-06 RX ORDER — LIDOCAINE HYDROCHLORIDE 20 MG/ML
INJECTION, SOLUTION INFILTRATION; PERINEURAL AS NEEDED
Status: DISCONTINUED | OUTPATIENT
Start: 2024-08-06 | End: 2024-08-06

## 2024-08-06 SDOH — HEALTH STABILITY: MENTAL HEALTH: CURRENT SMOKER: 0

## 2024-08-06 ASSESSMENT — COLUMBIA-SUICIDE SEVERITY RATING SCALE - C-SSRS
2. HAVE YOU ACTUALLY HAD ANY THOUGHTS OF KILLING YOURSELF?: NO
1. IN THE PAST MONTH, HAVE YOU WISHED YOU WERE DEAD OR WISHED YOU COULD GO TO SLEEP AND NOT WAKE UP?: NO
6. HAVE YOU EVER DONE ANYTHING, STARTED TO DO ANYTHING, OR PREPARED TO DO ANYTHING TO END YOUR LIFE?: NO

## 2024-08-06 ASSESSMENT — PAIN SCALES - GENERAL
PAIN_LEVEL: 0
PAINLEVEL_OUTOF10: 0 - NO PAIN

## 2024-08-06 ASSESSMENT — PAIN - FUNCTIONAL ASSESSMENT
PAIN_FUNCTIONAL_ASSESSMENT: 0-10

## 2024-08-06 NOTE — ANESTHESIA POSTPROCEDURE EVALUATION
Patient: Ridge Thomas    Procedure Summary       Date: 08/06/24 Room / Location: [facility]    Anesthesia Start: 1033 Anesthesia Stop: 1059    Procedure: COLONOSCOPY Diagnosis: Positive colorectal cancer screening using Cologuard test    Scheduled Providers: Ankur Courtney DO Responsible Provider: JOSE Lyles    Anesthesia Type: MAC ASA Status: 2            Anesthesia Type: MAC    Vitals Value Taken Time   /82 08/06/24 1058   Temp 36 °C (96.8 °F) 08/06/24 1058   Pulse 70 08/06/24 1058   Resp 13 08/06/24 1058   SpO2 96 % 08/06/24 1058       Anesthesia Post Evaluation    Patient location during evaluation: bedside  Patient participation: complete - patient participated  Level of consciousness: awake and alert  Pain score: 0  Pain management: adequate  Airway patency: patent  Cardiovascular status: acceptable  Respiratory status: acceptable  Hydration status: acceptable  Postoperative Nausea and Vomiting: none        No notable events documented.

## 2024-08-06 NOTE — ANESTHESIA PREPROCEDURE EVALUATION
Patient: Ridge Thomas    Procedure Information       Date/Time: 08/06/24 1000    Scheduled providers: Ankur Courtney DO    Procedure: COLONOSCOPY    Location: [facility]            Relevant Problems   Anesthesia (within normal limits)      Cardiac (within normal limits)      Pulmonary (within normal limits)      Neuro   (+) Lumbosacral radiculopathy due to degenerative joint disease of spine      GI (within normal limits)      /Renal (within normal limits)      Liver (within normal limits)      Endocrine (within normal limits)      Hematology (within normal limits)      Musculoskeletal   (+) Lumbosacral radiculopathy due to degenerative joint disease of spine       Clinical information reviewed:   Tobacco  Allergies  Meds   Med Hx  Surg Hx   Fam Hx  Soc Hx        NPO Detail:  NPO/Void Status  Carbohydrate Drink Given Prior to Surgery? : N  Date of Last Liquid: 08/06/24  Time of Last Liquid: 0400  Date of Last Solid: 08/04/24  Time of Last Solid: 1800  Last Intake Type: Clear fluids  Time of Last Void: 1011         Physical Exam    Airway  Mallampati: II  TM distance: >3 FB  Neck ROM: full     Cardiovascular   Rhythm: regular     Dental    Pulmonary   Breath sounds clear to auscultation     Abdominal            Anesthesia Plan    History of general anesthesia?: yes  History of complications of general anesthesia?: no    ASA 2     MAC     The patient is not a current smoker.    intravenous induction   Anesthetic plan and risks discussed with patient.

## 2024-08-06 NOTE — H&P
Outpatient [facility] Procedure H&P    Patient Profile  Name Ridge Thomas  Date of Birth 1955  MRN [id number]  PCP Jeremy Mendez        Diagnosis: Positive cologuard test.  Procedure(s):  Colonoscopy.    Allergies  No Known Allergies    Past Medical History   History reviewed. No pertinent past medical history.    Medication Reviewed - yes  Prior to Admission medications    Medication Sig Start Date End Date Taking? Authorizing Provider   clobetasol (Temovate) 0.05 % cream Apply topically 2 times a day. 4/9/24 12/5/24 Yes Jeremy Mendez DO   cyclobenzaprine (Flexeril) 10 mg tablet Take 1 tablet (10 mg) by mouth as needed at bedtime for muscle spasms. 3/1/24 8/6/24 Yes Felice Haines MD       Physical Exam  Vitals:    08/06/24 1007   BP: (!) 157/96   Pulse: 65   Resp: 13   Temp: 36.2 °C (97.2 °F)   SpO2: 97%      Weight   Vitals:    08/06/24 1007   Weight: 97.5 kg (215 lb)     BMI Body mass index is 29.16 kg/m².    General: A&Ox3, NAD.  HEENT: AT/NC.   CV: RRR. No murmur.  Resp: CTA bilaterally. No wheezing, rhonchi or rales.   GI: Soft, NT/ND.   Extrem: No edema.   Skin: No Jaundice.   Neuro: No focal deficits.   Psych: Normal mood and affect.        Oropharyngeal Classification II (hard and soft palate, upper portion of tonsils and uvula visible)  ASA PS Classification 2  Sedation Plan: Deep Sedation.  Procedure Plan - pre-procedural (re)assesment completed by physician:  discharge/transfer patient when discharge criteria met    Ankur Courtney DO  8/6/2024 10:30 AM

## 2024-08-06 NOTE — DISCHARGE INSTRUCTIONS
Patient Instructions Post Procedure      The anesthetics, sedatives or narcotics which were given to you today will be acting in your body for the next 24 hours, so you might feel a little sleepy or groggy.  This feeling should slowly wear off. Carefully read and follow the instructions.     You received sedation today:  - Do not drive or operate any machinery or power tools of any kind.   - No alcoholic beverages today, not even beer or wine.  - Do not make any important decisions or sign any legal documents.  - No over the counter medications that contain alcohol or that may cause drowsiness.    While it is common to experience mild to moderate abdominal distention, gas, or belching after your procedure, if any of these symptoms occur following discharge from the GI Lab or within one week of having your procedure, call the [facility] to be advised whether a visit to your nearest Urgent Care or Emergency Department is indicated.  Take this paper with you if you go.   - If you develop an allergic reaction to the medications that were given during your procedure such as difficulty breathing, rash, hives, severe nausea, vomiting or lightheadedness.  - If you experience chest pain, shortness of breath, severe abdominal pain, fevers and chills.  -If you develop signs and symptoms of bleeding such as blood in your spit, if your stools turn black, tarry, or bloody  - If you have not urinated within 8 hours following your procedure.  - If your IV site becomes painful, red, inflamed, or looks infected.    If you received a biopsy/polypectomy/sphincterotomy the following instructions apply below:  __ Do not use Aspirin containing products, non-steroidal medications or anti-coagulants for one week following your procedure. (Examples of these types of medications are: Advil, Arthrotec, Aleve, Coumadin, Ecotrin, Heparin, Ibuprofen, Indocin, Motrin, Naprosyn, Nuprin, Plavix, Vioxx, and Voltarin, or their generic  forms.  This list is not all-inclusive.  Check with your physician or pharmacist before resuming medications.)   __ Eat a soft diet today.  Avoid foods that are poorly digested for the next 24 hours.  These foods would include: nuts, beans, lettuce, red meats, and fried foods. Start with liquids and advance your diet as tolerated, gradually work up to eating solids.   __ Do not have a Barium Study or Enema for one week.    Your physician recommends the additional following instructions:    -You have a contact number available for emergencies. The signs and symptoms of potential delayed complications were discussed with you. You may return to normal activities tomorrow.  -Resume your previous diet or other if specified.  -Continue your present medications.   -We are waiting for your pathology results, if applicable.  -The findings and recommendations have been discussed with you and/or family.  - Please see Medication Reconciliation Form for new medication/medications prescribed.     If you experience any problems or have any questions following discharge from the GI Lab, please call: [phone] from 7 am- 4:30 pm.  In the event of an emergency please go to the closest Emergency Department or call Dr. Courtney's Office [phone]

## 2024-08-12 LAB
LABORATORY COMMENT REPORT: NORMAL
PATH REPORT.FINAL DX SPEC: NORMAL
PATH REPORT.GROSS SPEC: NORMAL
PATH REPORT.TOTAL CANCER: NORMAL

## 2024-08-13 ENCOUNTER — OFFICE VISIT (OUTPATIENT)
Dept: ORTHOPEDIC SURGERY | Facility: CLINIC | Age: 69
End: 2024-08-13
Payer: MEDICARE

## 2024-08-13 VITALS — BODY MASS INDEX: 25.73 KG/M2 | WEIGHT: 190 LBS | HEIGHT: 72 IN

## 2024-08-13 DIAGNOSIS — M54.50 LOW BACK PAIN, UNSPECIFIED BACK PAIN LATERALITY, UNSPECIFIED CHRONICITY, UNSPECIFIED WHETHER SCIATICA PRESENT: Primary | ICD-10-CM

## 2024-08-13 PROCEDURE — 3008F BODY MASS INDEX DOCD: CPT | Performed by: PHYSICIAN ASSISTANT

## 2024-08-13 PROCEDURE — 99213 OFFICE O/P EST LOW 20 MIN: CPT | Performed by: PHYSICIAN ASSISTANT

## 2024-08-13 NOTE — PROGRESS NOTES
Ridge Thomas is a 69 y.o. male who presents for Follow-up of the Lower Back.    HPI:  69-year-old gentleman here for follow-up of low back issues.  He denies any fever chills nausea vomiting night sweats.  He has no bowel or bladder complaints.    Physical exam:  Well-nourished, well kept.No lymphangitis or lymphadenopathy in the examined extremities.  Gait normal.  Can stand on heels and toes.   Examination of the back shows no tenderness in the paraspinous musculature.  There is no decreased range of motion in all directions due to guarding/muscle spasms and pain at extremes.  There is good strength and no instability.  Examination of the lower extremities reveals no point tenderness, swelling, or deformity.  Range of motion of the hips, knees, and ankles are full without crepitance, instability, or exacerbation of pain.  Strength is 5/5 throughout.  No redness, abrasions, or lesions on extremities  Gross sensation intact in the extremities.   Affect normal.  Alert and oriented ×3.  Coordination normal.    Assessment:  69-year-old gentleman here for follow-up of low back and left leg radicular symptoms.  He did a full course of physical therapy, and he is markedly better.  He thinks he is at least 50% better.  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  He is currently happy with where he is at and does not want to pursue any surgical intervention at this time.    Plan:  He would like another order for physical therapy that he can start sometime in the next couple of months, otherwise he is doing great.  I will see him back on an as-needed basis.    Cristhian Smallwood PA-C